# Patient Record
Sex: MALE | Race: WHITE | Employment: FULL TIME | URBAN - NONMETROPOLITAN AREA
[De-identification: names, ages, dates, MRNs, and addresses within clinical notes are randomized per-mention and may not be internally consistent; named-entity substitution may affect disease eponyms.]

---

## 2017-07-19 ENCOUNTER — HOSPITAL ENCOUNTER (OUTPATIENT)
Dept: PHYSICAL THERAPY | Age: 32
Setting detail: THERAPIES SERIES
Discharge: HOME | End: 2017-07-19
Payer: MEDICARE

## 2017-07-19 ENCOUNTER — HOSPITAL ENCOUNTER (OUTPATIENT)
Dept: OCCUPATIONAL THERAPY | Age: 32
Setting detail: THERAPIES SERIES
Discharge: HOME | End: 2017-07-19
Payer: MEDICARE

## 2017-07-19 DIAGNOSIS — M25.552 PAIN IN LEFT HIP: ICD-10-CM

## 2017-09-15 ENCOUNTER — APPOINTMENT (OUTPATIENT)
Dept: GENERAL RADIOLOGY | Age: 32
End: 2017-09-15
Payer: MEDICARE

## 2017-09-15 ENCOUNTER — HOSPITAL ENCOUNTER (EMERGENCY)
Age: 32
Discharge: HOME | End: 2017-09-15
Attending: FAMILY MEDICINE
Payer: MEDICARE

## 2017-09-15 VITALS
TEMPERATURE: 97 F | RESPIRATION RATE: 16 BRPM | HEART RATE: 65 BPM | OXYGEN SATURATION: 98 % | DIASTOLIC BLOOD PRESSURE: 88 MMHG | SYSTOLIC BLOOD PRESSURE: 131 MMHG

## 2017-09-15 DIAGNOSIS — S92.301A CLOSED DISPLACED FRACTURE OF METATARSAL BONE OF RIGHT FOOT, UNSPECIFIED METATARSAL, INITIAL ENCOUNTER: Primary | ICD-10-CM

## 2017-09-15 PROCEDURE — 73630 X-RAY EXAM OF FOOT: CPT

## 2017-09-15 PROCEDURE — 99283 EMERGENCY DEPT VISIT LOW MDM: CPT

## 2017-09-15 PROCEDURE — 29515 APPLICATION SHORT LEG SPLINT: CPT

## 2017-09-15 RX ORDER — HYDROCODONE BITARTRATE AND ACETAMINOPHEN 7.5; 325 MG/1; MG/1
1 TABLET ORAL EVERY 6 HOURS PRN
Qty: 12 TABLET | Refills: 0 | Status: SHIPPED | OUTPATIENT
Start: 2017-09-15 | End: 2017-09-22

## 2017-09-15 ASSESSMENT — ENCOUNTER SYMPTOMS
BACK PAIN: 0
VOMITING: 0
SHORTNESS OF BREATH: 0
WHEEZING: 0
NAUSEA: 0

## 2017-09-15 ASSESSMENT — PAIN DESCRIPTION - ORIENTATION: ORIENTATION: RIGHT

## 2017-09-15 ASSESSMENT — PAIN DESCRIPTION - LOCATION: LOCATION: FOOT

## 2017-09-27 ENCOUNTER — OFFICE VISIT (OUTPATIENT)
Dept: RHEUMATOLOGY | Age: 32
End: 2017-09-27
Payer: MEDICARE

## 2017-09-27 VITALS
RESPIRATION RATE: 16 BRPM | BODY MASS INDEX: 28.95 KG/M2 | SYSTOLIC BLOOD PRESSURE: 120 MMHG | DIASTOLIC BLOOD PRESSURE: 75 MMHG | HEIGHT: 68 IN | WEIGHT: 191 LBS | HEART RATE: 96 BPM

## 2017-09-27 DIAGNOSIS — M25.50 POLYARTHRALGIA: ICD-10-CM

## 2017-09-27 DIAGNOSIS — G89.29 CHRONIC LOW BACK PAIN WITHOUT SCIATICA, UNSPECIFIED BACK PAIN LATERALITY: ICD-10-CM

## 2017-09-27 DIAGNOSIS — R53.83 FATIGUE, UNSPECIFIED TYPE: Primary | ICD-10-CM

## 2017-09-27 DIAGNOSIS — M79.10 MYALGIA: ICD-10-CM

## 2017-09-27 DIAGNOSIS — M54.50 CHRONIC LOW BACK PAIN WITHOUT SCIATICA, UNSPECIFIED BACK PAIN LATERALITY: ICD-10-CM

## 2017-09-27 PROCEDURE — 99204 OFFICE O/P NEW MOD 45 MIN: CPT | Performed by: INTERNAL MEDICINE

## 2017-09-27 ASSESSMENT — ENCOUNTER SYMPTOMS
ABDOMINAL PAIN: 1
NAUSEA: 1
BLURRED VISION: 1
WHEEZING: 1
COUGH: 1
HEARTBURN: 1

## 2017-12-04 ENCOUNTER — HOSPITAL ENCOUNTER (OUTPATIENT)
Dept: GENERAL RADIOLOGY | Age: 32
Discharge: HOME | End: 2017-12-04
Payer: MEDICARE

## 2017-12-04 ENCOUNTER — HOSPITAL ENCOUNTER (OUTPATIENT)
Age: 32
Discharge: HOME | End: 2017-12-04
Payer: MEDICARE

## 2017-12-04 DIAGNOSIS — M54.50 CHRONIC LOW BACK PAIN WITHOUT SCIATICA, UNSPECIFIED BACK PAIN LATERALITY: ICD-10-CM

## 2017-12-04 DIAGNOSIS — G89.29 CHRONIC LOW BACK PAIN WITHOUT SCIATICA, UNSPECIFIED BACK PAIN LATERALITY: ICD-10-CM

## 2017-12-04 DIAGNOSIS — M79.10 MYALGIA: ICD-10-CM

## 2017-12-04 DIAGNOSIS — M25.50 POLYARTHRALGIA: ICD-10-CM

## 2017-12-04 DIAGNOSIS — R53.83 FATIGUE, UNSPECIFIED TYPE: ICD-10-CM

## 2017-12-04 LAB
ALBUMIN SERPL-MCNC: 4.2 G/DL (ref 3.5–5.1)
ALP BLD-CCNC: 76 U/L (ref 38–126)
ALT SERPL-CCNC: 17 U/L (ref 11–66)
ANION GAP SERPL CALCULATED.3IONS-SCNC: 10 MEQ/L (ref 8–16)
AST SERPL-CCNC: 16 U/L (ref 5–40)
BILIRUB SERPL-MCNC: 0.6 MG/DL (ref 0.3–1.2)
BUN BLDV-MCNC: 11 MG/DL (ref 7–22)
C-REACTIVE PROTEIN: 0.51 MG/DL (ref 0–1)
CALCIUM SERPL-MCNC: 9.1 MG/DL (ref 8.5–10.5)
CHLORIDE BLD-SCNC: 103 MEQ/L (ref 98–111)
CO2: 26 MEQ/L (ref 23–33)
CREAT SERPL-MCNC: 0.8 MG/DL (ref 0.4–1.2)
GFR SERPL CREATININE-BSD FRML MDRD: > 90 ML/MIN/1.73M2
GLUCOSE BLD-MCNC: 109 MG/DL (ref 70–108)
HCT VFR BLD CALC: 43.5 % (ref 42–52)
HEMOGLOBIN: 14.7 GM/DL (ref 14–18)
LD: 163 U/L (ref 100–190)
MCH RBC QN AUTO: 29.4 PG (ref 27–31)
MCHC RBC AUTO-ENTMCNC: 33.7 GM/DL (ref 33–37)
MCV RBC AUTO: 87.3 FL (ref 80–94)
PDW BLD-RTO: 13.6 % (ref 11.5–14.5)
PLATELET # BLD: 169 THOU/MM3 (ref 130–400)
PMV BLD AUTO: 8.6 MCM (ref 7.4–10.4)
POTASSIUM SERPL-SCNC: 4.1 MEQ/L (ref 3.5–5.2)
RBC # BLD: 4.99 MILL/MM3 (ref 4.7–6.1)
RHEUMATOID FACTOR: < 10 IU/ML (ref 0–13)
SEDIMENTATION RATE, ERYTHROCYTE: 10 MM/HR (ref 0–10)
SODIUM BLD-SCNC: 139 MEQ/L (ref 135–145)
TOTAL CK: 92 U/L (ref 55–170)
TOTAL PROTEIN: 7.2 G/DL (ref 6.1–8)
TSH SERPL DL<=0.05 MIU/L-ACNC: 0.68 UIU/ML (ref 0.4–4.2)
WBC # BLD: 9.3 THOU/MM3 (ref 4.8–10.8)

## 2017-12-04 PROCEDURE — 36415 COLL VENOUS BLD VENIPUNCTURE: CPT

## 2017-12-04 PROCEDURE — 83615 LACTATE (LD) (LDH) ENZYME: CPT

## 2017-12-04 PROCEDURE — 85027 COMPLETE CBC AUTOMATED: CPT

## 2017-12-04 PROCEDURE — 82550 ASSAY OF CK (CPK): CPT

## 2017-12-04 PROCEDURE — 72202 X-RAY EXAM SI JOINTS 3/> VWS: CPT

## 2017-12-04 PROCEDURE — 86038 ANTINUCLEAR ANTIBODIES: CPT

## 2017-12-04 PROCEDURE — 85651 RBC SED RATE NONAUTOMATED: CPT

## 2017-12-04 PROCEDURE — 72100 X-RAY EXAM L-S SPINE 2/3 VWS: CPT

## 2017-12-04 PROCEDURE — 86430 RHEUMATOID FACTOR TEST QUAL: CPT

## 2017-12-04 PROCEDURE — 80053 COMPREHEN METABOLIC PANEL: CPT

## 2017-12-04 PROCEDURE — 82085 ASSAY OF ALDOLASE: CPT

## 2017-12-04 PROCEDURE — 84443 ASSAY THYROID STIM HORMONE: CPT

## 2017-12-04 PROCEDURE — 86140 C-REACTIVE PROTEIN: CPT

## 2017-12-04 PROCEDURE — 86200 CCP ANTIBODY: CPT

## 2017-12-05 ENCOUNTER — TELEPHONE (OUTPATIENT)
Dept: RHEUMATOLOGY | Age: 32
End: 2017-12-05

## 2017-12-06 ENCOUNTER — TELEPHONE (OUTPATIENT)
Dept: RHEUMATOLOGY | Age: 32
End: 2017-12-06

## 2017-12-06 LAB
ALDOLASE: 3.9 U/L (ref 1.5–8.1)
CYCLIC CITRULLIN PEPTIDE AB: 3 UNITS (ref 0–19)

## 2017-12-07 ENCOUNTER — TELEPHONE (OUTPATIENT)
Dept: RHEUMATOLOGY | Age: 32
End: 2017-12-07

## 2017-12-07 LAB — ANA SCREEN: NORMAL

## 2017-12-11 ENCOUNTER — HOSPITAL ENCOUNTER (EMERGENCY)
Age: 32
Discharge: HOME | End: 2017-12-11
Payer: MEDICARE

## 2018-01-24 ENCOUNTER — HOSPITAL ENCOUNTER (OUTPATIENT)
Dept: OCCUPATIONAL THERAPY | Age: 33
Setting detail: THERAPIES SERIES
Discharge: HOME | End: 2018-01-24
Payer: MEDICARE

## 2018-01-24 PROCEDURE — 97165 OT EVAL LOW COMPLEX 30 MIN: CPT

## 2018-01-24 PROCEDURE — 97110 THERAPEUTIC EXERCISES: CPT

## 2018-01-24 ASSESSMENT — PAIN DESCRIPTION - LOCATION: LOCATION: SHOULDER

## 2018-01-24 ASSESSMENT — PAIN SCALES - GENERAL: PAINLEVEL_OUTOF10: 4

## 2018-01-24 ASSESSMENT — PAIN DESCRIPTION - ORIENTATION: ORIENTATION: RIGHT;OTHER (COMMENT)

## 2018-01-24 NOTE — PROGRESS NOTES
I certify that I have examined the patient below and determined that Physical Medicine and Rehabilitation service is necessary; that the secondary diagnosis for the provision of rehabilitation services is consistent with identified needs; that service will be furnished on an outpatient basis while the patient is in my care; that I approve the above plan of care for up to 90 days or as specifically noted above and will review it within that time frame or more often if the patients condition requires. Attestation, signature or co-signature of physician indicates approval of certification requirements.    ________________________ ____________ __________  Physician Signature   Date   Time    [address]    Time In: 1800  Time Out: 1850  Minutes: 50  Timed Code Treatment Minutes: 25 Minutes     Date: 2018  Patient Name: Timothy Benítez        CSN: [id number]     : 1985  (34 y.o.)  Gender: male   Referring Practitioner: Nadira Covarrubias PA-C  Diagnosis: S49.90XA shoulder injury, right slap tear, biceps tendonitis  Treatment Diagnosis: right shoulder pain  Additional Pertinent Hx: Patient relates that he started having problems last spring with right shoulder. Patient fell while in a diOmnigy week whacking. Had OT last summer and then had MRI which showed a \"slightly torn RC and torn bicep tendon\". Patient had surgery on 17. Patient relates that he was told that they \"put in 2-3 stitiches in [address] [place], [place] [address], relocated the bicep tendon,removed a spur, and cleaned it up. \" Script does not indicate this procedure. Reviewed medications and allergies with patient - correct on medical history form. Comorobidites include anxiety, brain injury, and arthritis. See Medical History Questionnaire for information related to allergies and medications.     General:  OT Visit Information  Onset Date: 18  OT Insurance Information: [place] [employer]age - 30 visits; no cold/hot packs, no ionto  Total # of Visits Approved: 30  Total # of Visits to Date: 1  Certification Period Expiration Date: 03/21/18  Progress Note Counter: 1/10 for PN  Comments: returns to referring provider on 2/28  Chart Reviewed: Yes    Restrictions/Precautions:       Position Activity Restriction  Other position/activity restrictions: no lifting with right arm         Subjective:  Subjective: Cooperative with evaluation. Reports that he was told that PA was pleased with his motion at his last appointment. Pain:  Pain Assessment  Patient Currently in Pain: Yes  Pain Assessment: 0-10  Pain Level: 4 (relates that pain increases to 10/10 for short time if he \"moves my arm wrong\". 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 Relates that has abnormal sensations between fingers on right hand - most noticeable during the shower.           Hand Dominance: Right                    RUE PROM (degrees)  RUE PROM: Exceptions  R Shoulder Flex  0-180: 140  R Shoulder ABduction 0-180: 110  R Shoulder Int Rotation  0-70: 35  R Shoulder Ext Rotation  0-90: 65  RUE AROM (degrees)  RUE AROM : Exceptions  R Shoulder Flexion 0-180: 90  R Shoulder ABduction 0-180: 80  R Shoulder Int Rotation  0-70: can get right thumb to the top of right back pocket  R Shoulder Ext Rotation 0-90: 32 at side                                                                                                      ADL  Additional Comments: Reports having difficulty sleeping - continuing to sleep in recliner or couch and relates that he wakes 1-2 x per night due to right arm pain. Patient relates having quite a bit of difficulty with dressing, hair care, bathing, and household activities. Patient is not allowed to do any lifting at current time. Activity Tolerance: Additional Comments: Tolerated evaluation and treatment well     Assessment:  Performance deficits / Impairments: Decreased ADL status, Decreased ROM, Decreased strength, Decreased high-level IADLs  Assessment: Patient meets criteria for low complexity evaluation based on documented evaluation findings. Prognosis: Good  Clinical Decision Making: Clinical Decision making was of Low Complexity as the result of analysis of data from a problem focused assessment, a consideration of a limited number of treatment options, no significant comorbidities affecting the plan of care and no modification or assistance required to complete the evaluation.     Patient Education:  Patient Education: OT POC, HEP - scapular retraction, backward shoulder circles, bilateral table slides for shoulder flexion, supine dowel for shoulder flexion and chest press  Barriers to Learning: none          Treatment Initiated : supine PROM to right shoulder in all planes to patient tolerance - slight hard end feel with IR; completed 10 reps of following exericses: bilateral table slides for shoulder flexion, supine dowel for shoulder flexion and chest press, scapular retraction, backward shoulder circles    Plan:  Times per week: 2-3 x week  Plan weeks: 8 weeks  Current Treatment Recommendations: Strengthening, ROM, Pain Management, Patient/Caregiver Education & Training, Other (comment) (taping)  Plan Comment: POC established and discussed with patient  Specific instructions for Next Treatment: PROM, stretching    Goals:  Patient goals : Be able to do my job. Be able to throw football with kids. Short term goals  Time Frame for Short term goals: 4 weeks  Short term goal 1: Be independent with HEP as instructed to increase his ability to care for children. Short term goal 2: Increase active right shoulder flexion to 120, abduction to 110, get right thumb to waistband behind back, and ER at side to 45 to increase ability to complete dressing tasks. Short term goal 3: Increase passive right shoulder flexion to 160, abduciton to 140, IR to 50, and ER to 80 to increase his flexibility to eventually allow patient to complete work takss. Short term goal 4: Report pain going no higher than 4/10 at any time in right shoulder to allow improved sleeping routine. Long term goals  Time Frame for Long term goals : 8 weeks  Long term goal 1: Be able to complete all dressing and bathing tasks without difficulty. Long term goal 2: Be able to retrieve 3# item from upper cupboard using right UE without pain or compensation. Long term goal 3: Be able to drive without right shoulder pain or any difficulty. Long term goal 4: Be able to use right arm to retrieve groceries from car and carry into house without difficulty. OT G-codes  Functional Assessment Tool Used: Upper Extremity Functional Scale  Score: 20       Evaluation Complexity: Based on the findings of patient history, examination, clinical presentation, and decision making during this evaluation, this patient is of low complexity.     Johnita Kussmaul, OTR/L #[id number]

## 2018-01-26 ENCOUNTER — HOSPITAL ENCOUNTER (OUTPATIENT)
Dept: OCCUPATIONAL THERAPY | Age: 33
Setting detail: THERAPIES SERIES
Discharge: HOME | End: 2018-01-26
Payer: MEDICARE

## 2018-01-26 PROCEDURE — 97110 THERAPEUTIC EXERCISES: CPT

## 2018-01-26 ASSESSMENT — PAIN SCALES - GENERAL: PAINLEVEL_OUTOF10: 3

## 2018-01-26 ASSESSMENT — PAIN DESCRIPTION - ORIENTATION: ORIENTATION: RIGHT

## 2018-01-26 ASSESSMENT — PAIN DESCRIPTION - LOCATION: LOCATION: SHOULDER

## 2018-01-26 NOTE — PROGRESS NOTES
[address] [address]  OUTPATIENT OCCUPATIONAL THERAPY  Daily Note  [facility]    Time In: 5742  Time Out: [address]  Minutes: 30  Timed Code Treatment Minutes: 30 Minutes     Date: 2018  Patient Name: Dago Frederick        CSN: [id number]   : 1985  (34 y.o.)  Gender: male   Referring Practitioner: Johny Freedman PA-C  Diagnosis: S49.90XA shoulder injury, right slap tear, biceps tendonitis          General:  OT Visit Information  Onset Date: 18  OT Insurance Information: [place] [employer] - 30 visits; no cold/hot packs, no ionto  Total # of Visits Approved: 30  Total # of Visits to Date: 2  Certification Period Expiration Date: 18  Progress Note Counter: 2/10 for PN  Comments: returns to referring provider on        Restrictions/Precautions:       Position Activity Restriction  Other position/activity restrictions: no lifting with right arm         Subjective:  Subjective: States that he was sore for a couple hours after evaluation. Relates that he didn't use any ice for pain control. Pain:  Patient Currently in Pain: Yes  Pain Assessment: 0-10  Pain Level: 3  Pain Location: Shoulder  Pain Orientation: Right       Objective:     Upper Extremity Function  UE AROM: scapular retraction and backward shoulder circles x 10 reps  UE PROM: bilateral table slides for shoulder flexion x 10 reps, table slides for right shoulder abduction x 10 reps; supine PROM to right shoulder in all planes to patient tolerance  UE AAROM: supine dowel for chest press and shoulder flexion x 15 reps each                                                Activity Tolerance: Additional Comments:  Tolerated session well    Assessment:  Assessment: Progressing toward goals    Patient Education:  Patient Education: importance of using ice for pain relief; table slides for abduction            Plan:  Plan Comment: Continue per established POC  Specific instructions for Next Treatment: PROM, stretching                      Shalom Counter, OTR/L #[id number]

## 2018-01-29 ENCOUNTER — HOSPITAL ENCOUNTER (OUTPATIENT)
Dept: OCCUPATIONAL THERAPY | Age: 33
Setting detail: THERAPIES SERIES
Discharge: HOME | End: 2018-01-29
Payer: MEDICARE

## 2018-01-29 PROCEDURE — 97110 THERAPEUTIC EXERCISES: CPT

## 2018-01-29 ASSESSMENT — PAIN SCALES - GENERAL: PAINLEVEL_OUTOF10: 3

## 2018-01-29 ASSESSMENT — PAIN DESCRIPTION - LOCATION: LOCATION: SHOULDER

## 2018-01-29 ASSESSMENT — PAIN DESCRIPTION - ORIENTATION: ORIENTATION: RIGHT

## 2018-01-29 NOTE — PROGRESS NOTES
goals    Patient Education:  Patient Education: wall slides for shoulder flexion, standing dowel for shoulder extension and IR up back            Plan:  Plan Comment: Continue per established POC  Specific instructions for Next Treatment: PROM, stretching                      Amanda Lopes OTR/L #[id number]

## 2018-01-31 ENCOUNTER — HOSPITAL ENCOUNTER (OUTPATIENT)
Dept: OCCUPATIONAL THERAPY | Age: 33
Setting detail: THERAPIES SERIES
Discharge: HOME | End: 2018-01-31
Payer: MEDICARE

## 2018-01-31 PROCEDURE — 97110 THERAPEUTIC EXERCISES: CPT

## 2018-01-31 ASSESSMENT — PAIN SCALES - GENERAL: PAINLEVEL_OUTOF10: 3

## 2018-01-31 ASSESSMENT — PAIN DESCRIPTION - LOCATION: LOCATION: SHOULDER

## 2018-01-31 ASSESSMENT — PAIN DESCRIPTION - ORIENTATION: ORIENTATION: RIGHT

## 2018-02-02 ENCOUNTER — HOSPITAL ENCOUNTER (OUTPATIENT)
Dept: OCCUPATIONAL THERAPY | Age: 33
Setting detail: THERAPIES SERIES
Discharge: HOME | End: 2018-02-02
Payer: MEDICARE

## 2018-02-02 PROCEDURE — 97110 THERAPEUTIC EXERCISES: CPT

## 2018-02-02 ASSESSMENT — PAIN DESCRIPTION - LOCATION: LOCATION: SHOULDER

## 2018-02-02 ASSESSMENT — PAIN SCALES - GENERAL: PAINLEVEL_OUTOF10: 3

## 2018-02-02 ASSESSMENT — PAIN DESCRIPTION - ORIENTATION: ORIENTATION: RIGHT

## 2018-02-02 NOTE — PROGRESS NOTES
[address] [place] [place] [address]  OUTPATIENT OCCUPATIONAL THERAPY  Daily Note  [facility]    Time In: 0830  Time Out: 0900  Minutes: 30  Timed Code Treatment Minutes: 30 Minutes     Date: 2018  Patient Name: Moses Villatoro        CSN: [id number]   : 1985  (34 y.o.)  Gender: male   Referring Practitioner: Lynn Zamora PA-C  Diagnosis: S49.90XA shoulder injury, right slap tear, biceps tendonitis          General:  OT Visit Information  Onset Date: 18  OT Insurance Information: [place] [employer]age - 30 visits; no cold/hot packs, no ionto  Total # of Visits Approved: 30  Total # of Visits to Date: 5  Certification Period Expiration Date: 18  Progress Note Counter: 5/10 for PN  Comments: returns to referring provider on        Restrictions/Precautions:       Position Activity Restriction  Other position/activity restrictions: no lifting with right arm         Subjective:  Subjective: Patient reports that he has a base pain of about 3/10. Pain:  Patient Currently in Pain: Yes  Pain Assessment: 0-10  Pain Level: 3  Pain Location: Shoulder  Pain Orientation: Right       Objective:     Upper Extremity Function  UE AROM: scapular retraction and backward shoulder circles x 10 reps; biodex at 120 speed x 3 minutes forward and 3 minutes backward - completed for ROM purposes only  UE PROM: bilateral table slides for shoulder flexion x 10 reps; supine PROM to right shoulder in all planes to patient tolerance except ER to protocol. UE AAROM: supine dowel for shoulder flexion and chest press x 15 reps each; standing dowel for shoulder extension and IR up back x 15 reps each  UE Stretching: bilateral wall slides for shoulder flexion x 15 reps; gentle GH joint mobs              Activity Tolerance: Additional Comments: Tolerated session well    Assessment:  Assessment: Progressing toward goals.       Patient Education:  Patient Education: no changes to HEP            Plan:  Current Treatment Recommendations: Strengthening, ROM, Pain Management, Patient/Caregiver Education & Training, Other (comment)  Plan Comment: Continue per established POC  Specific instructions for Next Treatment: PROM, stretching                      lAesha Martinez, MOT, OTR/L [id number]

## 2018-02-05 ENCOUNTER — HOSPITAL ENCOUNTER (OUTPATIENT)
Dept: OCCUPATIONAL THERAPY | Age: 33
Setting detail: THERAPIES SERIES
Discharge: HOME | End: 2018-02-05
Payer: MEDICARE

## 2018-02-05 PROCEDURE — 97110 THERAPEUTIC EXERCISES: CPT

## 2018-02-05 ASSESSMENT — PAIN SCALES - GENERAL: PAINLEVEL_OUTOF10: 5

## 2018-02-05 ASSESSMENT — PAIN DESCRIPTION - LOCATION: LOCATION: SHOULDER

## 2018-02-07 ENCOUNTER — HOSPITAL ENCOUNTER (OUTPATIENT)
Dept: OCCUPATIONAL THERAPY | Age: 33
Setting detail: THERAPIES SERIES
Discharge: HOME | End: 2018-02-07
Payer: MEDICARE

## 2018-02-07 PROCEDURE — 97110 THERAPEUTIC EXERCISES: CPT

## 2018-02-07 ASSESSMENT — PAIN SCALES - GENERAL: PAINLEVEL_OUTOF10: 4

## 2018-02-07 ASSESSMENT — PAIN DESCRIPTION - ORIENTATION: ORIENTATION: RIGHT

## 2018-02-07 ASSESSMENT — PAIN DESCRIPTION - LOCATION: LOCATION: SHOULDER

## 2018-02-09 ENCOUNTER — HOSPITAL ENCOUNTER (OUTPATIENT)
Dept: OCCUPATIONAL THERAPY | Age: 33
Setting detail: THERAPIES SERIES
Discharge: HOME | End: 2018-02-09
Payer: MEDICARE

## 2018-02-09 PROCEDURE — 97110 THERAPEUTIC EXERCISES: CPT

## 2018-02-09 ASSESSMENT — PAIN DESCRIPTION - ORIENTATION: ORIENTATION: RIGHT

## 2018-02-09 ASSESSMENT — PAIN SCALES - GENERAL: PAINLEVEL_OUTOF10: 3

## 2018-02-09 ASSESSMENT — PAIN DESCRIPTION - LOCATION: LOCATION: SHOULDER

## 2018-02-09 NOTE — PROGRESS NOTES
[address] [address]  OUTPATIENT OCCUPATIONAL THERAPY  Daily Note  [facility]    Time In: 8517  Time Out: 1055  Minutes: 25  Timed Code Treatment Minutes: 25 Minutes     Date: 2018  Patient Name: Heath Clark        CSN: [id number]   : 1985  (34 y.o.)  Gender: male   Referring Practitioner: Muna Mccoy PA-C  Diagnosis: S49.90XA shoulder injury, right slap tear, biceps tendonitis          General:  OT Visit Information  Onset Date: 18  OT Insurance Information: [place] [employer] - 30 visits; no cold/hot packs, no ionto  Total # of Visits Approved: 30  Total # of Visits to Date: 8  Certification Period Expiration Date: 18  Progress Note Counter: 8/10 for PN  Comments: returns to referring provider on        Restrictions/Precautions:       Position Activity Restriction  Other position/activity restrictions: no lifting with right arm         Subjective:  Subjective: Patient states that he was given script for flexeril and tramadol. States that the pain is helping with the pain. Pain:  Patient Currently in Pain: Yes  Pain Assessment: 0-10  Pain Level: 3  Pain Location: Shoulder  Pain Orientation: Right       Objective:     Upper Extremity Function  UE AROM: scapular retraction and backward shoulder circles x 10 reps each; biodex at 120 speed x 3 minutes forward and 3 minutes backward - compelted for ROM purposes only; rainbow circles on wall x 5 reps in each direction with left UE  UE PROM: bilateral table slides for shoulder flexion x 10 reps; table sldies for shoulder abduction x 10 reps; pulleys for shoulder flexion; supine PROM to right shoulder in all planes to patient tolerance  UE AAROM: bilateral wall slides for shoulder flexion x 10 reps  UE Stretching: standing sleeper stretch x 5 reps for right shoulder IR                                                Activity Tolerance: Additional Comments:  Tolerated session well    Assessment:  Assessment: Progressing toward goals.       Patient Education:  Patient Education: standing sleeper stretch, rainbow circles            Plan:  Plan Comment: Continue per established POC  Specific instructions for Next Treatment: PROM, stretching                      Avery Alexsandra, OTR/L #[id number]

## 2018-02-12 ENCOUNTER — HOSPITAL ENCOUNTER (OUTPATIENT)
Dept: OCCUPATIONAL THERAPY | Age: 33
Setting detail: THERAPIES SERIES
Discharge: HOME | End: 2018-02-12
Payer: MEDICARE

## 2018-02-12 PROCEDURE — 97110 THERAPEUTIC EXERCISES: CPT

## 2018-02-12 ASSESSMENT — PAIN DESCRIPTION - LOCATION: LOCATION: SHOULDER

## 2018-02-12 ASSESSMENT — PAIN SCALES - GENERAL: PAINLEVEL_OUTOF10: 3

## 2018-02-12 ASSESSMENT — PAIN DESCRIPTION - ORIENTATION: ORIENTATION: RIGHT

## 2018-02-14 ENCOUNTER — HOSPITAL ENCOUNTER (OUTPATIENT)
Dept: OCCUPATIONAL THERAPY | Age: 33
Setting detail: THERAPIES SERIES
End: 2018-02-14
Payer: MEDICARE

## 2018-02-16 ENCOUNTER — HOSPITAL ENCOUNTER (OUTPATIENT)
Dept: OCCUPATIONAL THERAPY | Age: 33
Setting detail: THERAPIES SERIES
Discharge: HOME | End: 2018-02-16
Payer: MEDICARE

## 2018-02-16 PROCEDURE — 97110 THERAPEUTIC EXERCISES: CPT

## 2018-02-16 ASSESSMENT — PAIN SCALES - GENERAL: PAINLEVEL_OUTOF10: 3

## 2018-02-16 ASSESSMENT — PAIN DESCRIPTION - LOCATION: LOCATION: SHOULDER

## 2018-02-16 ASSESSMENT — PAIN DESCRIPTION - ORIENTATION: ORIENTATION: RIGHT

## 2018-02-16 NOTE — PROGRESS NOTES
Activity Tolerance: Additional Comments: Tolerated session well    Assessment:  Assessment: Patient is making very nice progress toward goals. AROM and strength are progressing nicely. Patient does have limitations with IR of right shoulder. Further therapy is required to address AROM and strengthening of right shoulder to assist in patient returning to work duties. Patient Education:  Patient Education: goal status; [place] protocol with right UE, IR stretch behind back with towel            Plan:  Times per week: 2-3 x week  Plan weeks: 5 weeks  Plan Comment: POC updated and discussed with patient  Specific instructions for Next Treatment: AROM, strengthening    Patient goals : Be able to do my job. Be able to throw football with kids. Short term goals  Time Frame for Short term goals: 4 weeks  Short term goal 1: Be independent with HEP as instructed to increase his ability to care for children. GOAL MET - CONTINUE WITH HEP UPDATES  Short term goal 2: Increase active right shoulder flexion to 120, abduction to 110, get right thumb to waistband behind back, and ER at side to 45 to increase ability to complete dressing tasks. GOAL MET - SEE OBJECTIVE SECTION OF NOTE - REVISED GOAL; Increase active right shoulder IR to allow patient to get right thumb 5\" above waistband behind back and ER to 80 degrees to increase his ability to eventually do work tasks. Short term goal 3: Increase passive right shoulder flexion to 160, abduciton to 140, IR to 50, and ER to 80 to increase his flexibility to eventually allow patient to complete work tasks. GOAL MET - SEE OBJECTIVE SECTION OF NOTE FOR DETAILS - REVISED GOAL: Increase passive right shoulder IR to at least 65 to increase flexibility to allow patient to tuck in shirt with less difficulty. Short term goal 4: Report pain going no higher than 4/10 at any time in right shoulder to allow improved sleeping routine.  GOAL MET - REVISED GOAL; Report pain going no higher than 3/10 in right shoulder with work tasks. Long term goals  Time Frame for Long term goals : 5 weeks  Long term goal 1: Be able to complete all dressing and bathing tasks without difficulty. GOAL MET - REVISED GOAL:  Be able to use right arm to start leaf blower without difficulty. Long term goal 2: Be able to retrieve 3# item from upper cupboard using right UE without pain or compensation. GOAL NOT MET - CONTINUE GOAL  Long term goal 3: Be able to drive without right shoulder pain or any difficulty. GOAL NOT MET - RELATES VERY SLIGHT PAIN WITH TURNING STEERING WHEEL - [place] term goal 4: Be able to use right arm to retrieve groceries from car and carry into house without difficulty. 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              Harry Baeza, OTR/L #[id number]

## 2018-02-19 ENCOUNTER — HOSPITAL ENCOUNTER (OUTPATIENT)
Dept: OCCUPATIONAL THERAPY | Age: 33
Setting detail: THERAPIES SERIES
Discharge: HOME | End: 2018-02-19
Payer: MEDICARE

## 2018-02-19 PROCEDURE — 97110 THERAPEUTIC EXERCISES: CPT

## 2018-02-19 ASSESSMENT — PAIN SCALES - GENERAL: PAINLEVEL_OUTOF10: 2

## 2018-02-19 ASSESSMENT — PAIN DESCRIPTION - LOCATION: LOCATION: SHOULDER

## 2018-02-19 ASSESSMENT — PAIN DESCRIPTION - ORIENTATION: ORIENTATION: RIGHT

## 2018-02-21 ENCOUNTER — HOSPITAL ENCOUNTER (OUTPATIENT)
Dept: OCCUPATIONAL THERAPY | Age: 33
Setting detail: THERAPIES SERIES
End: 2018-02-21
Payer: MEDICARE

## 2018-02-23 ENCOUNTER — HOSPITAL ENCOUNTER (OUTPATIENT)
Dept: OCCUPATIONAL THERAPY | Age: 33
Setting detail: THERAPIES SERIES
End: 2018-02-23
Payer: MEDICARE

## 2018-02-26 ENCOUNTER — HOSPITAL ENCOUNTER (OUTPATIENT)
Dept: OCCUPATIONAL THERAPY | Age: 33
Setting detail: THERAPIES SERIES
Discharge: HOME | End: 2018-02-26
Payer: MEDICARE

## 2018-02-26 PROCEDURE — 97110 THERAPEUTIC EXERCISES: CPT

## 2018-02-26 ASSESSMENT — PAIN DESCRIPTION - ORIENTATION: ORIENTATION: RIGHT

## 2018-02-26 ASSESSMENT — PAIN SCALES - GENERAL: PAINLEVEL_OUTOF10: 2

## 2018-02-26 ASSESSMENT — PAIN DESCRIPTION - LOCATION: LOCATION: SHOULDER

## 2018-02-28 ENCOUNTER — HOSPITAL ENCOUNTER (OUTPATIENT)
Dept: OCCUPATIONAL THERAPY | Age: 33
Setting detail: THERAPIES SERIES
Discharge: HOME | End: 2018-02-28
Payer: MEDICARE

## 2018-02-28 PROCEDURE — 97110 THERAPEUTIC EXERCISES: CPT

## 2018-02-28 ASSESSMENT — PAIN DESCRIPTION - LOCATION: LOCATION: SHOULDER

## 2018-02-28 ASSESSMENT — PAIN DESCRIPTION - ORIENTATION: ORIENTATION: RIGHT

## 2018-02-28 ASSESSMENT — PAIN SCALES - GENERAL: PAINLEVEL_OUTOF10: 3

## 2018-03-02 ENCOUNTER — HOSPITAL ENCOUNTER (OUTPATIENT)
Dept: OCCUPATIONAL THERAPY | Age: 33
Setting detail: THERAPIES SERIES
Discharge: HOME | End: 2018-03-02
Payer: MEDICARE

## 2018-03-02 PROCEDURE — 97110 THERAPEUTIC EXERCISES: CPT

## 2018-03-02 ASSESSMENT — PAIN DESCRIPTION - ORIENTATION: ORIENTATION: RIGHT

## 2018-03-02 ASSESSMENT — PAIN SCALES - GENERAL: PAINLEVEL_OUTOF10: 2

## 2018-03-02 ASSESSMENT — PAIN DESCRIPTION - LOCATION: LOCATION: SHOULDER

## 2018-03-02 NOTE — PROGRESS NOTES
[address] [address]  OUTPATIENT OCCUPATIONAL THERAPY  Daily Note  [facility]    Time In: 1400  Time Out: 1440  Minutes: 40  Timed Code Treatment Minutes: 40 Minutes     Date: 3/2/2018  Patient Name: Julienne Vale        CSN: [id number]   : 1985  (34 y.o.)  Gender: male   Referring Practitioner: Kaitlin Pena PA-C  Diagnosis: S49.90XA shoulder injury, right slap tear, biceps tendonitis          General:  OT Visit Information  Onset Date: 18  OT Insurance Information: [place] [employer]age - 30 visits; no cold/hot packs, no ionto  Total # of Visits to Date: 14  Progress Note Counter: PN completed on ; 4/10 for PN  Comments: returns to referring provider on        Restrictions/Precautions:  Restrictions/Precautions: Weight Bearing    Position Activity Restriction  Other position/activity restrictions: no lifting with right arm         Subjective:  Subjective: Patient reports his shoulder still popps and has crunching with motion . Pain:  Patient Currently in Pain: Yes  Pain Assessment: 0-10  Pain Level: 2  Pain Location: Shoulder  Pain Orientation: Right       Objective:     Upper Extremity Function  UE AROM: rainbow circles on wall x 15 reps in each direction with right UE Supine shoulder  ceiling circles CW stopped at 5 anterior pain CCW 10 reps  UE PROM: supine PROM to right shoulder in all planes   UE Stretching: Sidelying sleeper stretch 5 reps with prolong hold, self sleeper stretc 5 reps.  Sleeper stretch added to HEP IR stretch behind back with towel x 5 reps with 5 second hold  UE Strengthing: bidoex at 60 speed x 3 minutes forward and 3 minutes backward; reaching endurance activity with right UE -    red theraband - standing riivalid x 15 reps, michael x 15 reps with right UE and cues for flexion not to go above shoulder level; supine PREs with 1# in right UE - shoulder flexion, horizontal abduction/adduction, ceiling punches, and ceiling circles x 15 reps

## 2018-03-05 ENCOUNTER — HOSPITAL ENCOUNTER (OUTPATIENT)
Dept: OCCUPATIONAL THERAPY | Age: 33
Setting detail: THERAPIES SERIES
Discharge: HOME | End: 2018-03-05
Payer: MEDICARE

## 2018-03-05 PROCEDURE — 97110 THERAPEUTIC EXERCISES: CPT

## 2018-03-05 ASSESSMENT — PAIN SCALES - GENERAL: PAINLEVEL_OUTOF10: 3

## 2018-03-05 ASSESSMENT — PAIN DESCRIPTION - LOCATION: LOCATION: SHOULDER

## 2018-03-05 ASSESSMENT — PAIN DESCRIPTION - ORIENTATION: ORIENTATION: RIGHT

## 2018-03-05 NOTE — PROGRESS NOTES
well    Assessment:  Assessment: Progressing toward goals    Patient Education:  Patient Education: standing sleeper stretch technique            Plan:  Plan Comment: Continue per established POC  Specific instructions for Next Treatment: AROM, strengthening                      Hermila Sky, OTR/L #[id number]

## 2018-03-07 ENCOUNTER — HOSPITAL ENCOUNTER (OUTPATIENT)
Dept: OCCUPATIONAL THERAPY | Age: 33
Setting detail: THERAPIES SERIES
Discharge: HOME | End: 2018-03-07
Payer: MEDICARE

## 2018-03-07 PROCEDURE — 97110 THERAPEUTIC EXERCISES: CPT

## 2018-03-07 ASSESSMENT — PAIN DESCRIPTION - ORIENTATION: ORIENTATION: RIGHT

## 2018-03-07 ASSESSMENT — PAIN SCALES - GENERAL: PAINLEVEL_OUTOF10: 1

## 2018-03-07 ASSESSMENT — PAIN DESCRIPTION - LOCATION: LOCATION: SHOULDER

## 2018-03-07 NOTE — PROGRESS NOTES
Education:  Patient Education: to use green theraband when arm is not sore after day's work; to gradually increase to 25 reps of therband exercises            Plan:  Plan Comment: Continue per established POC  Specific instructions for Next Treatment: AROM, strengthening                      Hermila Sky, OTR/L #[id number]

## 2018-03-09 ENCOUNTER — HOSPITAL ENCOUNTER (OUTPATIENT)
Dept: OCCUPATIONAL THERAPY | Age: 33
Setting detail: THERAPIES SERIES
Discharge: HOME | End: 2018-03-09
Payer: MEDICARE

## 2018-03-09 PROCEDURE — 97110 THERAPEUTIC EXERCISES: CPT

## 2018-03-09 ASSESSMENT — PAIN DESCRIPTION - ORIENTATION: ORIENTATION: RIGHT

## 2018-03-09 ASSESSMENT — PAIN DESCRIPTION - LOCATION: LOCATION: SHOULDER

## 2018-03-09 ASSESSMENT — PAIN SCALES - GENERAL: PAINLEVEL_OUTOF10: 1

## 2018-03-12 ENCOUNTER — HOSPITAL ENCOUNTER (OUTPATIENT)
Dept: OCCUPATIONAL THERAPY | Age: 33
Setting detail: THERAPIES SERIES
End: 2018-03-12
Payer: MEDICARE

## 2018-03-14 ENCOUNTER — HOSPITAL ENCOUNTER (OUTPATIENT)
Dept: OCCUPATIONAL THERAPY | Age: 33
Setting detail: THERAPIES SERIES
End: 2018-03-14
Payer: MEDICARE

## 2018-03-16 ENCOUNTER — HOSPITAL ENCOUNTER (OUTPATIENT)
Dept: OCCUPATIONAL THERAPY | Age: 33
Setting detail: THERAPIES SERIES
Discharge: HOME | End: 2018-03-16
Payer: MEDICARE

## 2018-03-16 PROCEDURE — 97110 THERAPEUTIC EXERCISES: CPT

## 2018-03-16 ASSESSMENT — PAIN DESCRIPTION - LOCATION: LOCATION: SHOULDER

## 2018-03-16 ASSESSMENT — PAIN DESCRIPTION - ORIENTATION: ORIENTATION: RIGHT

## 2018-03-16 ASSESSMENT — PAIN SCALES - GENERAL: PAINLEVEL_OUTOF10: 1

## 2018-03-16 NOTE — PROGRESS NOTES
established POC  Specific instructions for Next Treatment: AROM, strengthening, fitness center                      George Tyson, OTR/L #[id number]

## 2018-03-19 ENCOUNTER — HOSPITAL ENCOUNTER (OUTPATIENT)
Dept: OCCUPATIONAL THERAPY | Age: 33
Setting detail: THERAPIES SERIES
Discharge: HOME | End: 2018-03-19
Payer: MEDICARE

## 2018-03-19 PROCEDURE — 97110 THERAPEUTIC EXERCISES: CPT

## 2018-03-19 ASSESSMENT — PAIN DESCRIPTION - LOCATION: LOCATION: SHOULDER

## 2018-03-19 ASSESSMENT — PAIN DESCRIPTION - ORIENTATION: ORIENTATION: RIGHT

## 2018-03-19 ASSESSMENT — PAIN SCALES - GENERAL: PAINLEVEL_OUTOF10: 2

## 2018-03-19 NOTE — PROGRESS NOTES
[facility]  OUTPATIENT OCCUPATIONAL THERAPY  Daily Note  [facility]    Time In: 3049  Time Out: 1700  Minutes: 30  Timed Code Treatment Minutes: 30 Minutes     Date: 3/19/2018  Patient Name: Modesto Richey        CSN: [id number]   : 1985  (34 y.o.)  Gender: male   Referring Practitioner: Ramon Seay PA-C  Diagnosis: S49.90XA shoulder injury, right slap tear, biceps tendonitis          General:  OT Visit Information  Onset Date: 18  OT Insurance Information: [place] [employer]age - 30 visits; no cold/hot packs, no ionto  Total # of Visits Approved: 30  Total # of Visits to Date: 23  Certification Period Expiration Date: 18  Progress Note Counter: PN completed on ; 9/10 for PN  Comments: returns to referring provider on        Restrictions/Precautions:       Position Activity Restriction  Other position/activity restrictions: no lifting with right arm         Subjective:  Subjective: States that he is worn out today. States that he is not any more sore today than he typically is at the end of the work day since returning to work. Pain:  Patient Currently in Pain: Yes  Pain Assessment: 0-10  Pain Level: 2  Pain Location: Shoulder  Pain Orientation: Right       Objective:     Upper Extremity Function  UE Strengthing: biodex at 50 speed x 3 minutes forward and 3 minutes backward ; fitness center workout: bicep curl at 25# x 15 reps, chest press at 40# x 15 rpes, seated row at 40# x 15 reps, shoulder press at 25# x 15 reps, triceps at 40# reps; body blade with right UE - 1 minute in following positions: overhead, 90 shoulder flexion, 90 shoulder abduction, down at side; green theraband - 20 reps of michael, horizontal abduction/adduction, overhead, resisted diagonals with right UE                                                Activity Tolerance: Additional Comments:  Tolerated session well    Assessment:  Assessment: Progressing toward goals    Patient

## 2018-03-21 ENCOUNTER — HOSPITAL ENCOUNTER (OUTPATIENT)
Dept: OCCUPATIONAL THERAPY | Age: 33
Setting detail: THERAPIES SERIES
End: 2018-03-21
Payer: MEDICARE

## 2018-03-23 ENCOUNTER — HOSPITAL ENCOUNTER (OUTPATIENT)
Dept: OCCUPATIONAL THERAPY | Age: 33
Setting detail: THERAPIES SERIES
Discharge: HOME | End: 2018-03-23
Payer: MEDICARE

## 2018-03-23 PROCEDURE — 97110 THERAPEUTIC EXERCISES: CPT

## 2018-03-23 NOTE — PROGRESS NOTES
shoulder if he pushes himself up from the floor. States that he still doesn't have the strength that he needs. 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); pain is 4-5/10 when it \"pops\" for \"a split second\")       Objective:     Upper Extremity Function  UE AROM: active right shoulder IR  = can get right thumb 7\" above the waistband behind back, ER = 90  UE PROM: passive IR in standing = 80  UE Strengthing: MMT of right shoulder = 4+/5 for all motions; bidoex at 50 speed x 3 minutes forward and  3 minutes backward and handles were positioned at nose level to increase difficulty of activity; plank with bilateral elbow flexion x 10 seconds; green theraband - 25 reps of following with right UE: overhead, mcihael, horizontal abduction/adduction, and resisted diagonals                                                 Activity Tolerance: Additional Comments: Tolerated session well    Assessment:  Assessment: Patient is making very nice progress toward goals. Continues to have some weakness in overhead activities and HEP additions are addressing this issue. Patient's ROM is WFL. Further therapy is required to address strength of right UE to allow patient to complete all work tasks without difficulty. Patient Education:  Patient Education: goal status; planks with bilateral elbow flexion            Plan:  Times per week: 1 x week  Plan weeks: 2 weeks  Plan Comment: POC updated and discussed with patient  Specific instructions for Next Treatment: strengthening    Patient goals : Be able to do my job. Be able to throw football with kids. Short term goals  Time Frame for Short term goals: 2 weeks  Short term goal 1: Be independent with HEP as instructed to increase his ability to care for children. GOAL MET - CONTINUE WITH HEP UPDATES  Short term goal 2:   Increase active right shoulder IR to allow patient to get right thumb 5\" above waistband

## 2018-03-26 ENCOUNTER — HOSPITAL ENCOUNTER (OUTPATIENT)
Dept: OCCUPATIONAL THERAPY | Age: 33
Setting detail: THERAPIES SERIES
Discharge: HOME | End: 2018-03-26
Payer: MEDICARE

## 2018-03-26 PROCEDURE — 97110 THERAPEUTIC EXERCISES: CPT

## 2018-03-26 ASSESSMENT — PAIN SCALES - GENERAL: PAINLEVEL_OUTOF10: 1

## 2018-03-26 ASSESSMENT — PAIN DESCRIPTION - ORIENTATION: ORIENTATION: RIGHT

## 2018-03-26 ASSESSMENT — PAIN DESCRIPTION - LOCATION: LOCATION: SHOULDER

## 2018-04-09 ENCOUNTER — HOSPITAL ENCOUNTER (OUTPATIENT)
Dept: OCCUPATIONAL THERAPY | Age: 33
Setting detail: THERAPIES SERIES
Discharge: HOME | End: 2018-04-09
Payer: MEDICARE

## 2019-10-28 ENCOUNTER — HOSPITAL ENCOUNTER (OUTPATIENT)
Dept: GENERAL RADIOLOGY | Age: 34
Discharge: HOME | End: 2019-10-28
Payer: MEDICARE

## 2019-10-28 ENCOUNTER — HOSPITAL ENCOUNTER (OUTPATIENT)
Age: 34
Discharge: HOME | End: 2019-10-28
Payer: MEDICARE

## 2019-10-28 DIAGNOSIS — R76.8 ELEVATED ANTINUCLEAR ANTIBODY (ANA) LEVEL: ICD-10-CM

## 2019-10-28 PROCEDURE — 72202 X-RAY EXAM SI JOINTS 3/> VWS: CPT

## 2019-11-26 ENCOUNTER — HOSPITAL ENCOUNTER (OUTPATIENT)
Dept: MRI IMAGING | Age: 34
Discharge: HOME | End: 2019-11-26
Payer: MEDICARE

## 2019-11-26 DIAGNOSIS — M54.89 INFLAMMATORY BACK PAIN: ICD-10-CM

## 2019-11-26 DIAGNOSIS — G89.29 CHRONIC MIDLINE LOW BACK PAIN, UNSPECIFIED WHETHER SCIATICA PRESENT: ICD-10-CM

## 2019-11-26 DIAGNOSIS — M54.50 CHRONIC MIDLINE LOW BACK PAIN, UNSPECIFIED WHETHER SCIATICA PRESENT: ICD-10-CM

## 2019-11-26 PROCEDURE — 72195 MRI PELVIS W/O DYE: CPT

## 2020-07-17 NOTE — PROGRESS NOTES
Progressing toward goals.       Patient Education:               Plan:  Plan Comment: Continue per established POC  Specific instructions for Next Treatment: PROM, stretching                      Shalom Counter, OTR/L #[id number]
caffeine

## 2022-12-05 ENCOUNTER — HOSPITAL ENCOUNTER (OUTPATIENT)
Dept: OCCUPATIONAL THERAPY | Age: 37
Setting detail: THERAPIES SERIES
Discharge: HOME | End: 2022-12-05
Payer: MEDICARE

## 2022-12-05 PROCEDURE — 97165 OT EVAL LOW COMPLEX 30 MIN: CPT

## 2022-12-05 PROCEDURE — 97110 THERAPEUTIC EXERCISES: CPT

## 2022-12-05 NOTE — PROGRESS NOTES
** PLEASE SIGN, DATE AND TIME CERTIFICATION BELOW AND RETURN TO [facility] OUTPATIENT REHABILITATION (FAX #: [phone]). ATTEST/CO-SIGN IF ACCESSING VIA [employer]. THANK YOU.**    I certify that I have examined the patient below and determined that Physical Medicine and Rehabilitation service is necessary and that I approve the established plan of care for up to 90 days or as specifically noted. Attestation, signature or co-signature of physician indicates approval of certification requirements.    ________________________ ____________ __________  Physician Signature   Date   Time   [address] THERAPY  [x] EVALUATION  [] DAILY NOTE (LAND) [] DAILY NOTE (AQUATIC ) [] PROGRESS NOTE [] DISCHARGE NOTE    [] [address] [facility]   [] [facility] 90    [x] [address] [facility]   [] [facility]    Date: 2022  Patient Name:  Chuy Dominguez  : 1985  MRN: [id number]  CSN: [id number]    Referring Practitioner Maren Epsinosa*   Diagnosis Other shoulder lesions, right shoulder [M75.81]    Treatment Diagnosis Right shoulder pain   Date of Evaluation 22      Functional Outcome Measure Used UEFS   Functional Outcome Score 57/80 (22)       Insurance: Primary: Payor: Obadiah Barthel /  /  / ,   Secondary:    Authorization Information: PRECERTIFICATION REQUIRED:  n/a  INSURANCE THERAPY BENEFIT:  Allowed 30 visits Physical Therapy /Occupational Therapy/Speech Therapy per calendar year. No visit limit for PT/OT/ST for patients age 8 and under. FCE-Covered, no precert required. Benefit will not cover maintenance or preventative treatment. AQUATIC THERAPY COVERED:   Yes  MODALITIES COVERED:  Yes.  Iontophoresis and Hot/Cold Packs are not covered   Visit # 1, 1/10 for progress note   Visits Allowed: 30   Recertification Date: 3/98/35   Physician Follow-Up: No scheduled follow up with referring provider - patient is to call back after a couple of weeks of therapy   Physician Orders: Evaluate and treat   Pertinent History: Patient relates that he had right shoulder surgery 5 years ago which included RCR and bicep tendon repair. Patient states that pain returned about 1.5 years after surgery and he has just been tolerating the pain since that time. States that about 3-4 weeks ago he had increased pain when blowing leaves. Went to [facility] due to right shoulder pain. States that xrays didn't show anything and therapy was ordered for right shoulder pain. Comorbidities include anxiety and fibromyalgia that could influence rehab process. SUBJECTIVE: Patient relates that he does not want to have surgery on his shoulder if at all possible. States that he is currently off work until 12/28 due to right shoulder pain. Social/Functional History:  Patient is currently not taking any medications. Ronel Carrasquillo lives with spouse   Task Prior Level of Function  (current level of function addressed below)   ADLs  Independent   Ambulation Independent   Transfers Independent   Hobbies Playing guitar, throwing football with kids   Driving Active    Work [employer]. Occupation:  of [employer] (currently off work until 12/28/22)     OBJECTIVE:  [address] right handed   Palpation    Observation Demonstrates hypermobility of right shoulder with flexion, abduction, and ER. Patient reports that he has sensation of right shoulder \"popping out\" during motion. Posture good   Edema    Special Tests        ADL's Patient relates that he has difficulty getting comfortable to fall asleep. States that he has moderate difficulty with work tasks and using right UE in repetitive tasks. Bed Mobility     Transfers    Balance        Sensation Reports having numbness and tingling in both arms. States that his arms \"go numb\" at night.    Reported having right hand tingling after AROM testing of right shoulder   Coordination [facility] RIGHT UPPER EXTREMITY  RANGE OF MOTION    AROM COMMENTS        Shoulder Flexion 957 Reports clicking in shoulder when bringing arm back down into extension   Shoulder Extension 70    Shoulder Abduction 180 Slight decreased right scapulohumeral rhythm with active abduction   Shoulder External Rotation 90    Shoulder Internal Rotation Can get right thumb 9\" above waistband behind back        RIGHT UPPER EXTREMITY  STRENGTH    Strength Rating Comments   Shoulder Flexion 4+/5    Shoulder Extension 4/5    Shoulder Abduction 4+/5    Shoulder Adduction 4+/5    Shoulder External Rotation 4+/5    Shoulder Internal Rotation 4+/5      TREATMENT   Precautions: Anxiety, fibromyalgia   Pain:  3/10 at time of therapy; 8-9/10 at highest (throwing ball, repetitive use) LOCATION: anterior right shoulder - does relate that the pain does move around in his shoulder. X in shaded column indicates Activity Completed Today   Modalities Parameters/  Location  Notes/Comments                     Manual Therapy Time/  Technique  Notes/Comments                     Exercises   Sets/  Sec Reps  Notes/Comments                                                    Activities Time    Notes/Comments   Rock tape applied to right shoulder - Y strip around deltoid, Y strip on upper trapezius, I strip along bicep with 50% tension  x Completed for pain management and shoulder stability                 Specific Interventions Next Treatment: ultrasound, taping, scapular strengthening    Activity/Treatment Tolerance:  [x]  Patient tolerated treatment well  []  Patient limited by fatigue  []  Patient limited by pain   []  Patient limited by other medical complications  []  Other:     Assessment: Patient meets criteria for low complexity evaluation based on documented evaluation findings.  Patient presents with pain in right shoulder, decreased shoulder stability, and decreased scapular stability which is interfering with his ability to complete work tasks. Skilled therapy services are required to address right UE strength and right shoulder pain to allow patient to eventually complete his normal work tasks. Areas for Improvement: impaired strength, pain, and impaired ADL and shoulder stability  Prognosis: good    GOALS:  Patient Goal: To avoid surgery. 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 Report pain going no higher than 3/10 in right shoulder to allow patient to complete activities with his kids. Report being able to fall asleep with less difficulty due to right shoulder pain. Long Term Goals:  Time Frame: 12 weeks  Report pain going no higher than 2/10 at any time to increase his ability to complete his normal work tasks. Report being able to throw ball with kids using right arm with no more than 2/10 pain. Patient Education:   [x]  HEP/Education Completed: Plan of Care, Goals, HEP - use/care of rock tape; scapular retraction    []  No new Education completed  []  Reviewed Prior HEP      [x]  Patient verbalized and/or demonstrated understanding of education provided. []  Patient unable to verbalize and/or demonstrate understanding of education provided. Will continue education. [x]  Barriers to learning: none    PLAN:  Treatment Recommendations: Strengthening, Pain Management, Home Exercise Program, Self-Care Education and Training, and Modalities    [x]  Plan of care initiated. Plan to see patient 2 times per week for 12 weeks to address the treatment planned outlined above.   []  Continue with current plan of care  []  Modify plan of care as follows:    []  Hold pending physician visit  []  Discharge    Time In 1345   Time Out 1430   Timed Code Minutes: 15 min   Total Treatment Time: 45 min     Pablo Faye OTR/NATE #[id number]

## 2022-12-07 ENCOUNTER — HOSPITAL ENCOUNTER (OUTPATIENT)
Dept: OCCUPATIONAL THERAPY | Age: 37
Setting detail: THERAPIES SERIES
Discharge: HOME | End: 2022-12-07
Payer: MEDICARE

## 2022-12-07 PROCEDURE — 97110 THERAPEUTIC EXERCISES: CPT

## 2022-12-07 PROCEDURE — 97035 APP MDLTY 1+ULTRASOUND EA 15: CPT

## 2022-12-07 NOTE — PROGRESS NOTES
[address]  [] EVALUATION  [x] DAILY NOTE (LAND) [] DAILY NOTE (AQUATIC ) [] PROGRESS NOTE [] DISCHARGE NOTE    [] [address] [facility]   [] [facility]    [x] [facility]   [] [facility]    Date: 2022  Patient Name:  Chuy Dominguez  :   MRN: [id number]  CSN: [id number]    Referring Practitioner Maren Espinosa*   Diagnosis Other shoulder lesions, right shoulder [M75.81]    Treatment Diagnosis Right shoulder pain   Date of Evaluation 22      Functional Outcome Measure Used UEFS   Functional Outcome Score 57/80 (22)       Insurance: Primary: Payor: Obadiah Barthel /  /  / ,   Secondary:    Authorization Information: PRECERTIFICATION REQUIRED:  n/a  INSURANCE THERAPY BENEFIT:  Allowed 30 visits Physical Therapy /Occupational Therapy/Speech Therapy per calendar year. No visit limit for PT/OT/ST for patients age 8 and under. FCE-Covered, no precert required. Benefit will not cover maintenance or preventative treatment. AQUATIC THERAPY COVERED:   Yes  MODALITIES COVERED:  Yes. Iontophoresis and Hot/Cold Packs are not covered   Visit # 2, 2/10 for progress note   Visits Allowed: 30   Recertification Date:    Physician Follow-Up: No scheduled follow up with referring provider - patient is to call back after a couple of weeks of therapy   Physician Orders: Evaluate and treat   Pertinent History: Patient relates that he had right shoulder surgery 5 years ago which included RCR and bicep tendon repair. Patient states that pain returned about 1.5 years after surgery and he has just been tolerating the pain since that time. States that about 3-4 weeks ago he had increased pain when blowing leaves. Went to [facility] due to right shoulder pain. States that xrays didn't show anything and therapy was ordered for right shoulder pain.  Comorbidities include anxiety and fibromyalgia that could influence rehab process. SUBJECTIVE: States that he feels as though the tape makes his shoulder feel more stable but he has experienced some pain in different locations of right shoulder since tape in place. OBJECTIVE:    TREATMENT   Precautions: Anxiety, fibromyalgia   Pain:  2-3/10 pain at time of therapy LOCATION: anterior right shoulder - does relate that the pain does move around in his shoulder. X in shaded column indicates Activity Completed Today   Modalities Parameters/  Location  Notes/Comments   Ultrasound to anterior right shoulder at 100% continuous, 1 mHz, 1.2 w/cm2 for 8 minutes using ultrasound gel  x Complete for pain management               Manual Therapy Time/  Technique  Notes/Comments                     Exercises   Sets/  Sec Reps  Notes/Comments   Biodex at 80 speed x 5 minutes backward only   x    Modified hughstons with right UE - horizontal abduction/adduction with palm down and with thumb up, scaption with palm down and with thumb up 1 10 each x 5 second hold                                      Activities Time    Notes/Comments   Rock tape applied to right shoulder - Y strip around deltoid, Y strip on upper trapezius, I strip along bicep and around lateral shoulder with 100% tension for GH mechanical improvement  x Completed for pain management and shoulder stability                 Specific Interventions Next Treatment: ultrasound, taping, scapular strengthening    Activity/Treatment Tolerance:  [x]  Patient tolerated treatment well  []  Patient limited by fatigue  []  Patient limited by pain   []  Patient limited by other medical complications  []  Other:     Assessment: Patient is progressing toward goals. Patient was able to tolerate initiation of strengthening exercises this date. Patient does tend to roll right shoulder forward and patient was educated regarding need for improved posture. GOALS:  Patient Goal: To avoid surgery.     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 Report pain going no higher than 3/10 in right shoulder to allow patient to complete activities with his kids. Report being able to fall asleep with less difficulty due to right shoulder pain. Long Term Goals:  Time Frame: 12 weeks  Report pain going no higher than 2/10 at any time to increase his ability to complete his normal work tasks. Report being able to throw ball with kids using right arm with no more than 2/10 pain. Patient Education:   [x]  HEP/Education Completed: purpose of ultrasound; modified hughstons - horizontal abduction/adduction with palm down and thumb up, scaption with palm down and thumb up    []  No new Education completed  []  Reviewed Prior HEP      [x]  Patient verbalized and/or demonstrated understanding of education provided. []  Patient unable to verbalize and/or demonstrate understanding of education provided. Will continue education. [x]  Barriers to learning: none    PLAN:      []  Plan of care initiated. Plan to see patient 2 times per week for 12 weeks to address the treatment planned outlined above.   [x]  Continue with current plan of care  []  Modify plan of care as follows:    []  Hold pending physician visit  []  Discharge    Time In 1245   Time Out 1330   Timed Code Minutes: 45 min   Total Treatment Time: 45 min     ANA MARIA Quezada/NATE #[id number]

## 2022-12-14 ENCOUNTER — HOSPITAL ENCOUNTER (OUTPATIENT)
Dept: OCCUPATIONAL THERAPY | Age: 37
Setting detail: THERAPIES SERIES
Discharge: HOME | End: 2022-12-14
Payer: MEDICARE

## 2022-12-14 PROCEDURE — 97110 THERAPEUTIC EXERCISES: CPT

## 2022-12-14 PROCEDURE — 97140 MANUAL THERAPY 1/> REGIONS: CPT

## 2022-12-14 PROCEDURE — 97035 APP MDLTY 1+ULTRASOUND EA 15: CPT

## 2022-12-14 NOTE — PROGRESS NOTES
[address]  [] EVALUATION  [x] DAILY NOTE (LAND) [] DAILY NOTE (AQUATIC ) [] PROGRESS NOTE [] DISCHARGE NOTE    [] [address] [facility]   [] [facility]    [facility]   [] [facility]    Date: 2022  Patient Name:  Angelica Dhaliwal  : [birth year]  MRN: [id number]  CSN: [id number]    Referring Practitioner Basil Sy*   Diagnosis Other shoulder lesions, right shoulder [M75.81]    Treatment Diagnosis Right shoulder pain   Date of Evaluation 22      Functional Outcome Measure Used UEFS   Functional Outcome Score 57/80 (22)       Insurance: Primary: Payor: Alana Chaudhryer /  /  / ,   Secondary:    Authorization Information: PRECERTIFICATION REQUIRED:  n/a  INSURANCE THERAPY BENEFIT:  Allowed 30 visits Physical Therapy /Occupational Therapy/Speech Therapy per calendar year. No visit limit for PT/OT/ST for patients age 8 and under. FCE-Covered, no precert required. Benefit will not cover maintenance or preventative treatment. AQUATIC THERAPY COVERED:   Yes  MODALITIES COVERED:  Yes. Iontophoresis and Hot/Cold Packs are not covered   Visit # 3, 3/10 for progress note   Visits Allowed: 30   Recertification Date: 42   Physician Follow-Up: No scheduled follow up with referring provider - patient is to call back after a couple of weeks of therapy   Physician Orders: Evaluate and treat   Pertinent History: Patient relates that he had right shoulder surgery 5 years ago which included RCR and bicep tendon repair. Patient states that pain returned about 1.5 years after surgery and he has just been tolerating the pain since that time. States that about 3-4 weeks ago he had increased pain when blowing leaves. Went to [facility] due to right shoulder pain. States that xrays didn't show anything and therapy was ordered for right shoulder pain.  Comorbidities include anxiety and fibromyalgia that could influence rehab process. SUBJECTIVE: States that he has noticed that since the last shoulder taping he had some pain in the right shoulder joint - \"like it was jammed\". States that he took the tape off on Monday and this feeling went away. States that his original pain has returned since the taping was taken off. States that he now has some pain in his right upper trapezius (more noticeable without the tape). States that his right shoulder feels \"more sturdy\" when it is taped but the pain continues to be present. States that the pain is \"inside my shoulder\" when the tape is in place.          OBJECTIVE:    TREATMENT   Precautions: Anxiety, fibromyalgia   Pain:  3/10 pain at time of therapy LOCATION: right upper trapezius and lateral deltoid    X in shaded column indicates Activity Completed Today   Modalities Parameters/  Location  Notes/Comments   Ultrasound to right upper trapezius at 100% continuous, 1 mHz, 1.2 w/cm2 for 8 minutes using ultrasound gel  x Complete for pain management               Manual Therapy Time/  Technique  Notes/Comments   IASTM and STM completed to right upper trapezius, rhomboid, and scalene region  x High amount of tightness present               Exercises   Sets/  Sec Reps  Notes/Comments   Biodex at 80 speed x 5 minutes backward only       Modified hughstons with right UE - horizontal abduction/adduction with palm down and with thumb up, scaption with palm down and with thumb up 1 10 each  5 second hold   Right upper trapezius stretch with right hand behind back with lateral neck flexion 1 5 x 10 second hold; completed in standing    Right upper trapezius stretch with right hand behind back while looking into left axilla 1 5 x 10 second hold; completed in standing                        Activities Time    Notes/Comments   Rock tape applied to right shoulder - Y strip around deltoid, Y strip on upper trapezius, I strip along bicep and around lateral shoulder with 100% tension for [address] mechanical improvement   Completed for pain management and shoulder stability                 Specific Interventions Next Treatment: ultrasound, taping, scapular strengthening    Activity/Treatment Tolerance:  [x]  Patient tolerated treatment well  []  Patient limited by fatigue  []  Patient limited by pain   []  Patient limited by other medical complications  []  Other:     Assessment: Patient continues to have right shoulder pain and is now complaining of more neck pain. Patient did report having less tightness in right upper trapezius and neck region at end of session. Patient could benefit from further testing and/or orthopedic consultation for right shoulder pain. GOALS:  Patient Goal: To avoid surgery. 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 Report pain going no higher than 3/10 in right shoulder to allow patient to complete activities with his kids. Report being able to fall asleep with less difficulty due to right shoulder pain. Long Term Goals:  Time Frame: 12 weeks  Report pain going no higher than 2/10 at any time to increase his ability to complete his normal work tasks. Report being able to throw ball with kids using right arm with no more than 2/10 pain. Patient Education:   [x]  HEP/Education Completed: right upper trapezius stretch with lateral neck flexion and upper trapezius stretch with looking into left axilla    []  No new Education completed  []  Reviewed Prior HEP      [x]  Patient verbalized and/or demonstrated understanding of education provided. []  Patient unable to verbalize and/or demonstrate understanding of education provided. Will continue education. [x]  Barriers to learning: none    PLAN:      []  Plan of care initiated. Plan to see patient 2 times per week for 12 weeks to address the treatment planned outlined above.   [x]  Continue with current plan of care  []  Modify plan of care as follows:    []  Hold pending physician visit  []  Discharge    Time In 1005   Time Out 1045   Timed Code Minutes: 40 min   Total Treatment Time: 40 min     Jose Armando Dalton OTR/NATE #[id number]

## 2022-12-16 ENCOUNTER — HOSPITAL ENCOUNTER (OUTPATIENT)
Dept: OCCUPATIONAL THERAPY | Age: 37
Setting detail: THERAPIES SERIES
Discharge: HOME | End: 2022-12-16
Payer: MEDICARE

## 2022-12-16 PROCEDURE — 97110 THERAPEUTIC EXERCISES: CPT

## 2022-12-16 PROCEDURE — 97140 MANUAL THERAPY 1/> REGIONS: CPT

## 2022-12-16 PROCEDURE — 97035 APP MDLTY 1+ULTRASOUND EA 15: CPT

## 2022-12-16 NOTE — PROGRESS NOTES
[address]  [] EVALUATION  [x] DAILY NOTE (LAND) [] DAILY NOTE (AQUATIC ) [] PROGRESS NOTE [] DISCHARGE NOTE    [] [facility]   [] [address]    [x] [facility]   [] Laure Sheffield    Date: 2022  Patient Name:  Vandana Pelaez  :   MRN: [id number]  CSN: [id number]    Referring Practitioner Johnnie Newman*   Diagnosis Other shoulder lesions, right shoulder [M75.81]    Treatment Diagnosis Right shoulder pain   Date of Evaluation 22      Functional Outcome Measure Used UEFS   Functional Outcome Score 57/80 (22)       Insurance: Primary: Payor: Alex Izquierdo /  /  / ,   Secondary:    Authorization Information: PRECERTIFICATION REQUIRED:  n/a  INSURANCE THERAPY BENEFIT:  Allowed 30 visits Physical Therapy /Occupational Therapy/Speech Therapy per calendar year. No visit limit for PT/OT/ST for patients age 8 and under. FCE-Covered, no precert required. Benefit will not cover maintenance or preventative treatment. AQUATIC THERAPY COVERED:   Yes  MODALITIES COVERED:  Yes. Iontophoresis and Hot/Cold Packs are not covered   Visit # 4, /10 for progress note   Visits Allowed: 30   Recertification Date: 12   Physician Follow-Up: No scheduled follow up with referring provider - patient is to call back after a couple of weeks of therapy   Physician Orders: Evaluate and treat   Pertinent History: Patient relates that he had right shoulder surgery 5 years ago which included RCR and bicep tendon repair. Patient states that pain returned about 1.5 years after surgery and he has just been tolerating the pain since that time. States that about 3-4 weeks ago he had increased pain when blowing leaves. Went to [facility] due to right shoulder pain. States that xrays didn't show anything and therapy was ordered for right shoulder pain.  Comorbidities include anxiety and fibromyalgia that could influence rehab process. SUBJECTIVE: States that he contacted [facility]NP and asked for a referral for orthopedic surgeon. States that he hasn't heard anything about the referral yet. States that he was sore after last therapy session - states that most of the pain is in his right upper trapezius and down the back of his upper right arm. States that he feels a high amount of stretching when he turns his head to the left.           OBJECTIVE:    TREATMENT   Precautions: Anxiety, fibromyalgia   Pain:  4/10 pain at time of therapy LOCATION: right upper trapezius and lateral deltoid    X in shaded column indicates Activity Completed Today   Modalities Parameters/  Location  Notes/Comments   Ultrasound to right upper trapezius at 100% continuous, 1 mHz, 1.2 w/cm2 for 8 minutes using ultrasound gel  x Complete for pain management               Manual Therapy Time/  Technique  Notes/Comments    and STM completed to right upper trapezius region  x Tightness present, but less than last therapy session; reported it feeling looser at end of session               Exercises   Sets/  Sec Reps  Notes/Comments   Biodex at 80 speed x 5 minutes backward only   x    Modified hughstons with right UE - horizontal abduction/adduction with palm down and with thumb up, scaption with palm down and with thumb up 1 10 each  5 second hold   Right upper trapezius stretch with right hand behind back with lateral neck flexion 1 5  10 second hold; completed in standing    Right upper trapezius stretch with right hand behind back while looking into left axilla 1 5  10 second hold; completed in standing   Scapular retraction 1 15 x    Backward shoulder circles 1 15 x           Activities Time    Notes/Comments   Rock tape applied to right shoulder - Y strip around deltoid, Y strip on upper trapezius,   x Completed for pain management                  Specific Interventions Next Treatment: ultrasound, taping, scapular strengthening    Activity/Treatment Tolerance:  [x]  Patient tolerated treatment well  []  Patient limited by fatigue  []  Patient limited by pain   []  Patient limited by other medical complications  []  Other:     Assessment: Patient is progressing slowly toward goals, but tightness is decreasing slightly in left upper trapezius region. Patient could benefit from dry needling to right upper trapezius and shoulder regions - OTR will send script to referring provider. GOALS:  Patient Goal: To avoid surgery. 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 Report pain going no higher than 3/10 in right shoulder to allow patient to complete activities with his kids. Report being able to fall asleep with less difficulty due to right shoulder pain. Long Term Goals:  Time Frame: 12 weeks  Report pain going no higher than 2/10 at any time to increase his ability to complete his normal work tasks. Report being able to throw ball with kids using right arm with no more than 2/10 pain. Patient Education:   [x]  HEP/Education Completed: plan for OTR to send script for dry needling    []  No new Education completed  []  Reviewed Prior HEP      [x]  Patient verbalized and/or demonstrated understanding of education provided. []  Patient unable to verbalize and/or demonstrate understanding of education provided. Will continue education. [x]  Barriers to learning: none    PLAN:      []  Plan of care initiated. Plan to see patient 2 times per week for 12 weeks to address the treatment planned outlined above.   [x]  Continue with current plan of care  []  Modify plan of care as follows:    []  Hold pending physician visit  []  Discharge    Time In 1050   Time Out 1130   Timed Code Minutes: 40 min   Total Treatment Time: 40 min     Tony Cadet OTR/L #[id number]

## 2022-12-19 ENCOUNTER — HOSPITAL ENCOUNTER (OUTPATIENT)
Dept: OCCUPATIONAL THERAPY | Age: 37
Setting detail: THERAPIES SERIES
Discharge: HOME | End: 2022-12-19
Payer: MEDICARE

## 2022-12-19 PROCEDURE — 97110 THERAPEUTIC EXERCISES: CPT

## 2022-12-19 PROCEDURE — 97140 MANUAL THERAPY 1/> REGIONS: CPT

## 2022-12-19 NOTE — PROGRESS NOTES
[address]  [] EVALUATION  [x] DAILY NOTE (LAND) [] DAILY NOTE (AQUATIC ) [] PROGRESS NOTE [] DISCHARGE NOTE    [] [facility]   [] [address]    [x] [facility]   [] Mick Duel    Date: 2022  Patient Name:  Efraim Hodgkins  :   MRN: [id number]  CSN: [id number]    Referring Practitioner Yovani Smith*   Diagnosis Other shoulder lesions, right shoulder [M75.81]    Treatment Diagnosis Right shoulder pain   Date of Evaluation 22      Functional Outcome Measure Used UEFS   Functional Outcome Score 57/80 (22)       Insurance: Primary: Payor: Nuris Wagner /  /  / ,   Secondary:    Authorization Information: PRECERTIFICATION REQUIRED:  n/a  INSURANCE THERAPY BENEFIT:  Allowed 30 visits Physical Therapy /Occupational Therapy/Speech Therapy per calendar year. No visit limit for PT/OT/ST for patients age 8 and under. FCE-Covered, no precert required. Benefit will not cover maintenance or preventative treatment. AQUATIC THERAPY COVERED:   Yes  MODALITIES COVERED:  Yes. Iontophoresis and Hot/Cold Packs are not covered   Visit # 5, 5/10 for progress note   Visits Allowed: 30   Recertification Date:    Physician Follow-Up: [facility] - 22   Physician Orders: Evaluate and treat; dry needling script of 22   Pertinent History: Patient relates that he had right shoulder surgery 5 years ago which included RCR and bicep tendon repair. Patient states that pain returned about 1.5 years after surgery and he has just been tolerating the pain since that time. States that about 3-4 weeks ago he had increased pain when blowing leaves. Went to [facility] due to right shoulder pain. States that xrays didn't show anything and therapy was ordered for right shoulder pain. Comorbidities include anxiety and fibromyalgia that could influence rehab process.      SUBJECTIVE: States that he has an appointment with [facility] on 12/21/22. OBJECTIVE:    TREATMENT   Precautions: Anxiety, fibromyalgia   Pain:  3/10 pain at time of therapy LOCATION: right upper trapezius and lateral deltoid    X in shaded column indicates Activity Completed Today   Modalities Parameters/  Location  Notes/Comments   Ultrasound to right upper trapezius at 100% continuous, 1 mHz, 1.2 w/cm2 for 8 minutes using ultrasound gel   Complete for pain management               Manual Therapy Time/  Technique  Notes/Comments    and STM completed to right upper trapezius region  x Completed after dry needling               Exercises   Sets/  Sec Reps  Notes/Comments   Biodex at 80 speed x 5 minutes backward only   x    Modified hughstons with right UE - horizontal abduction/adduction with palm down and with thumb up, scaption with palm down and with thumb up 1 10 each  5 second hold   Right upper trapezius stretch with right hand behind back with lateral neck flexion 1 5  10 second hold; completed in standing    Right upper trapezius stretch with right hand behind back while looking into left axilla 1 5  10 second hold; completed in standing   Scapular retraction 1 15     Backward shoulder circles 1 15            Activities Time    Notes/Comments   Rock tape applied to right shoulder - Y strip around deltoid, Y strip on upper trapezius,    Completed for pain management                  Dry Needling Safety Questions Response   Are you currently receiving any form of cancer treatment? No   Do you have any form of a bleeding disorder? No   Have you ever fainted or experienced a seizure? No   Do you have a pacemaker or any other electrical implant? No   Are you currently taking any anticoagulants? No   Are you currently taking antibiotics for an infection? No   Do you have a damaged heart valve, metal prosthesis, or other risk of infection? No   Are you pregnant or actively trying for a pregnancy? No   Do you have breast implants?  No Do you suffer from metal allergies? No   Are you diabetic or do you suffer from impaired wound healing? No   Do you have hepatitis B, hepatitis C, HIV, or any other infectious disease? No   Have you eaten in the last two hours? Yes: Comment: ate lunch     Dry needling manual therapy: consisted of the placement of 4  needles in the following muscles: right upper trapezius, , . A 40 mm needle was inserted, pistoned, rotated, and/or coned to produce intramuscular mobilization. These techniques were used to restore functional range of motion, reduce muscle spasm and induce healing in the corresponding musculature. (90452)   Clean Technique was utilized today while applying Dry needling treatment. The treatment sites were cleaned with 70% solution of isopropyl alcohol. The therapist washed/sanitized their hands and utilized treatment gloves prior to inserting the needles. All needles were removed and discarded in the appropriate sharps container. \"      Specific Interventions Next Treatment: ultrasound, taping, scapular strengthening    Activity/Treatment Tolerance:  [x]  Patient tolerated treatment well  []  Patient limited by fatigue  []  Patient limited by pain   []  Patient limited by other medical complications  []  Other:     Assessment: Patient tolerated dry needling treatment well. Patient did have slightly decreased tightness in right upper trapezius at end of session, but patient did report soreness. Instructed patient to hydrate after session. GOALS:  Patient Goal: To avoid surgery. 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 Report pain going no higher than 3/10 in right shoulder to allow patient to complete activities with his kids. Report being able to fall asleep with less difficulty due to right shoulder pain.     Long Term Goals:  Time Frame: 12 weeks  Report pain going no higher than 2/10 at any time to increase his ability to complete his normal work tasks. Report being able to throw ball with kids using right arm with no more than 2/10 pain. Patient Education:   [x]  HEP/Education Completed: dry needling principles    []  No new Education completed  []  Reviewed Prior HEP      [x]  Patient verbalized and/or demonstrated understanding of education provided. []  Patient unable to verbalize and/or demonstrate understanding of education provided. Will continue education. [x]  Barriers to learning: none    PLAN:      []  Plan of care initiated. Plan to see patient 2 times per week for 12 weeks to address the treatment planned outlined above.   [x]  Continue with current plan of care  []  Modify plan of care as follows:    []  Hold pending physician visit  []  Discharge    Time In 1435   Time Out 1515   Timed Code Minutes: 40 min   Total Treatment Time: 40 min     Sharda Jose OTR/L #[id number]

## 2022-12-21 ENCOUNTER — HOSPITAL ENCOUNTER (OUTPATIENT)
Dept: OCCUPATIONAL THERAPY | Age: 37
Setting detail: THERAPIES SERIES
Discharge: HOME | End: 2022-12-21
Payer: MEDICARE

## 2022-12-21 PROCEDURE — 97140 MANUAL THERAPY 1/> REGIONS: CPT

## 2022-12-21 PROCEDURE — 97110 THERAPEUTIC EXERCISES: CPT

## 2022-12-21 NOTE — PROGRESS NOTES
[address]  [] EVALUATION  [x] DAILY NOTE (LAND) [] DAILY NOTE (AQUATIC ) [] PROGRESS NOTE [] DISCHARGE NOTE    [] [facility]   [] [address]    [x] [facility]   [] Janee Guzman    Date: 2022  Patient Name:  Meghna Schmidt  : [birth year]  MRN: [id number]  CSN: [id number]    Referring Practitioner Bety Sifuentes*   Diagnosis Other shoulder lesions, right shoulder [M75.81]    Treatment Diagnosis Right shoulder pain   Date of Evaluation 22      Functional Outcome Measure Used UEFS   Functional Outcome Score 57/80 (22)       Insurance: Primary: Payor: Scot Kirk /  /  / ,   Secondary:    Authorization Information: PRECERTIFICATION REQUIRED:  n/a  INSURANCE THERAPY BENEFIT:  Allowed 30 visits Physical Therapy /Occupational Therapy/Speech Therapy per calendar year. No visit limit for PT/OT/ST for patients age 8 and under. FCE-Covered, no precert required. Benefit will not cover maintenance or preventative treatment. AQUATIC THERAPY COVERED:   Yes  MODALITIES COVERED:  Yes. Iontophoresis and Hot/Cold Packs are not covered   Visit # 6, 6/10 for progress note   Visits Allowed: 30   Recertification Date:    Physician Follow-Up: [facility] - 22   Physician Orders: Evaluate and treat; dry needling script of 22   Pertinent History: Patient relates that he had right shoulder surgery 5 years ago which included RCR and bicep tendon repair. Patient states that pain returned about 1.5 years after surgery and he has just been tolerating the pain since that time. States that about 3-4 weeks ago he had increased pain when blowing leaves. Went to [facility] due to right shoulder pain. States that xrays didn't show anything and therapy was ordered for right shoulder pain. Comorbidities include anxiety and fibromyalgia that could influence rehab process.      SUBJECTIVE: Patient states that he didn't have as much tightness on right side of neck after dry needling and this lasted about a day. Tightness is present now. Patient states that he has a lot of \"pressure\" on the outside of the \"ball of my shoulder. \"         OBJECTIVE:    TREATMENT   Precautions: Anxiety, fibromyalgia   Pain:  4/10 pain at time of therapy LOCATION: right upper trapezius and lateral deltoid    X in shaded column indicates Activity Completed Today   Modalities Parameters/  Location  Notes/Comments   Ultrasound to right upper trapezius at 100% continuous, 1 mHz, 1.2 w/cm2 for 8 minutes using ultrasound gel   Complete for pain management               Manual Therapy Time/  Technique  Notes/Comments   IASTM and STM completed to right upper trapezius region and rhomboid region  x High amount of tightness present in these regions   STM to upper trapezius and neck region while supine  x          Exercises   Sets/  Sec Reps  Notes/Comments   Biodex at 80 speed x 5 minutes backward only   x    Modified hughstons with right UE - horizontal abduction/adduction with palm down and with thumb up, scaption with palm down and with thumb up 1 10 each  5 second hold   Right upper trapezius stretch with right hand behind back with lateral neck flexion 1 5  10 second hold; completed in standing    Right upper trapezius stretch with right hand behind back while looking into left axilla 1 5  10 second hold; completed in standing   Scapular retraction 1 15     Backward shoulder circles 1 15            Activities Time    Notes/Comments   Rock tape applied to right shoulder - Y strip around deltoid, Y strip on upper trapezius,    Completed for pain management                        Specific Interventions Next Treatment: ultrasound, taping, scapular strengthening    Activity/Treatment Tolerance:  [x]  Patient tolerated treatment well  []  Patient limited by fatigue  []  Patient limited by pain   []  Patient limited by other medical complications  []  Other:     Assessment: Patient is progressing toward goals slowly. Patient did relate that he did have slightly less pain in his neck and shoulder at end of session. GOALS:  Patient Goal: To avoid surgery. 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 Report pain going no higher than 3/10 in right shoulder to allow patient to complete activities with his kids. Report being able to fall asleep with less difficulty due to right shoulder pain. Long Term Goals:  Time Frame: 12 weeks  Report pain going no higher than 2/10 at any time to increase his ability to complete his normal work tasks. Report being able to throw ball with kids using right arm with no more than 2/10 pain. Patient Education:   []  HEP/Education Completed:     [x]  No new Education completed  []  Reviewed Prior HEP      [x]  Patient verbalized and/or demonstrated understanding of education provided. []  Patient unable to verbalize and/or demonstrate understanding of education provided. Will continue education. [x]  Barriers to learning: none    PLAN:      []  Plan of care initiated. Plan to see patient 2 times per week for 12 weeks to address the treatment planned outlined above.   [x]  Continue with current plan of care  []  Modify plan of care as follows:    []  Hold pending physician visit  []  Discharge    Time In 1135   Time Out 1215   Timed Code Minutes: 40 min   Total Treatment Time: 40 min     Migdalia Jones OTR/L #[id number]

## 2022-12-21 NOTE — PROGRESS NOTES
[facility]  Therapy Contact Note      Date: 2022  Patient Name: Davida Chung        MRN: [id number]    : 1985  (44 y.o.)  Gender: male   Kenyatta Parada*  Other shoulder lesions, right shoulder [M75.81]     Patient left message for therapist that Dr. Amador Aschoff wants him to hold shoulder therapy until after MRI. Patient will be placed on hold until MRI results. Therapist will continue to be in contact with patient.     Gregory Rea, OTR/L #[id number]

## 2022-12-28 ENCOUNTER — APPOINTMENT (OUTPATIENT)
Dept: OCCUPATIONAL THERAPY | Age: 37
End: 2022-12-28
Payer: MEDICARE

## 2022-12-30 ENCOUNTER — HOSPITAL ENCOUNTER (OUTPATIENT)
Dept: PHYSICAL THERAPY | Age: 37
Setting detail: THERAPIES SERIES
Discharge: HOME | End: 2022-12-30
Payer: MEDICARE

## 2022-12-30 ENCOUNTER — APPOINTMENT (OUTPATIENT)
Dept: OCCUPATIONAL THERAPY | Age: 37
End: 2022-12-30
Payer: MEDICARE

## 2022-12-30 PROCEDURE — 97162 PT EVAL MOD COMPLEX 30 MIN: CPT

## 2022-12-30 PROCEDURE — 97110 THERAPEUTIC EXERCISES: CPT

## 2022-12-30 NOTE — FLOWSHEET NOTE
** PLEASE SIGN, DATE AND TIME CERTIFICATION BELOW AND RETURN TO [facility] OUTPATIENT REHABILITATION (FAX #: [phone]). ATTEST/CO-SIGN IF ACCESSING VIA [employer]. THANK YOU.**    I certify that I have examined the patient below and determined that Physical Medicine and Rehabilitation service is necessary and that I approve the established plan of care for up to 90 days or as specifically noted. Attestation, signature or co-signature of physician indicates approval of certification requirements.    ________________________ ____________ __________  Physician Signature   Date   Time  [address] [facility]  PHYSICAL THERAPY  [x] EVALUATION  [] DAILY NOTE (LAND) [] DAILY NOTE (AQUATIC ) [] PROGRESS NOTE [] DISCHARGE NOTE    [] [facility]   [] [facility]    [x] [facility]   [] [facility]    Date: 2022  Patient Name:  Jose Garcia  : 1985  MRN: [id number]  CSN: [id number]    Referring Practitioner Jamaal Del Angel MD   Diagnosis Unspecified disorder of synovium and tendon, right shoulder [M67.911]  Other cervical disc degeneration, unspecified cervical region [M50.30]    Treatment Diagnosis Neck pain with radiculopathy bilat   Date of Evaluation 22    Additional Pertinent History Anxiety disorder, Brain injury, Arthritis, Fibromyalgia      Functional Outcome Measure Used NDI   Functional Outcome Score 8 (22)       Insurance: Primary: Payor: Cesar Reinoso /  /  / ,   Secondary:    Authorization Information: Allowed 30 visits per calendar year. Aquatics and modalities except Into and HPCP covered.    Visit # 1, 1/10 for progress note   Visits Allowed: 30   Recertification Date: 83   Physician Follow-Up: MRI on 23 then follow up with doctor on 23   Physician Orders:    History of Present Illness:      SUBJECTIVE: Patient has been seeing OT for right shoulder issues and about 3 weeks ago or so started having neck pain.  has had testing of his neck done in the past and it showed DDD.  is on hold right now with OT and the doctor does not want anything more done until he has his MRI done. [facility] doctor wants him to have a few sessions of PT for his neck to see what might be going on in with it. Social/Functional History and Current Status:  Medications and Allergies have been reviewed and are listed on Medical History Questionnaire. Daphney Moreno lives with spouse in a multiple floor home with stairs and a handrail to enter. Task Previous Current   ADLs  Independent Independent   IADL's Independent Modified Independent   Ambulation Independent Independent   Transfers Independent Independent   Recreation Independent Modified Independent   Community Integration Independent Modified Independent   Driving Active  Active    Work [employer]. Occupation: [employer]   Off Work Due to Injury.        OBJECTIVE:    Pain: 3/10 right side neck   Palpation    Observation Forward head and rounded shoulders   Posture Fair       Range of Motion Bilat UE not tested due to doctor does not want him moving right arm around a lot until MRI  Cervical: Flex 56, Ext 70, SB R 37, SB L 37, Rot R 70, Rot L 59   Strength Bilat UE not tested due to doctor does not want him moving right arm around a lot until MRI   Coordination    Sensation Sporadic numbness/tingling only with lifting or moving something heavy and does not last  Denies headaches   Bed Mobility    Transfers    Ambulation    Stairs    Balance States slight balance issues but is not falling or running into walls   Special Tests          TREATMENT   Precautions: None - has had to put OT on hold because doctor does not want overuse of right shoulder until MRI   Pain: 3/10 Right side cerv, slight 1-2/10 on left side    \"X in shaded column indicates activity completed today    *\" next to exercise/intervention indicates progression Modalities Parameters/  Location  Notes                     Manual Therapy Time/Technique  Notes                     Exercise/  Intervention   Notes   Loaded retractions light pressure: 2x10                                                                  Deep pressure 2x10     Light pressure with 25% ext 30 sec                             With 50% ext 30 sec                             With 75% ext 30 sec   X    X  X  X  X Decreased neck/upper back pain and right arm symptoms  No change  No change  No change  Pinch in neck felt   Educated on retractions and retractions with extension every 2 hours and monitoring symptoms. Stop if increases or produces if causes any symptoms   X Ret sets of 10  Ret with ext no more than 2x for 30 sec each   Educated on cervical isometrics 6 directions 5x5 seconds  X    Educated on SB and levator stretches along with head nodding and tipping   X    Educated on rows   X                                                Specific Interventions Next Treatment: cervical retractions and retraction with extension as needed, posture education, strengthening and stretching, modalities or manual therapy as needed    Activity/Treatment Tolerance:  [x]  Patient tolerated treatment well  []  Patient limited by fatigue  []  Patient limited by pain   []  Patient limited by medical complications  []  Other:     Assessment: Patient with history of damage in neck but only started having pain in the last 3 weeks. Patient may have a disc issue due to his neck pain and right arm symptoms decreased with use of cervical retractions. Will continue to have patient work on HEP and see how much is able to relieve symptoms and PT will adjust program as needed based on his symptom response. Patient also needs educated on strengthening but is limited to what can do due to right shoulder problems and waiting on an MRI for that.     Body Structures/Functions/Activity Limitations: impaired activity tolerance, impaired endurance, impaired ROM, pain, and abnormal posture  Prognosis: good    GOALS:  Patient Goal: To have neck feel a little looser. Short Term Goals:  Time Frame: 4 weeks  See LTG      Long Term Goals:  Time Frame: 4 weeks  1) Patient to report 25-50% decrease in neck pain for ease with sleeping  2) Patient report able to consistently decrease bilateral arm symptoms to be able to drive longer  3) Patient to be fully educated on postural corrections and all stretches to help keep spine in more neutral position  4) Patient to be independent with HEP to improve stability in spine to decrease stress with daily activity       Patient Education:   [x]  HEP/Education Completed: Plan of Care, Goals, HEP above  [address] Access Code:  []  No new Education completed  []  Reviewed Prior HEP      [x]  Patient verbalized and/or demonstrated understanding of education provided. []  Patient unable to verbalize and/or demonstrate understanding of education provided. Will continue education. []  Barriers to learning: None    PLAN:  Treatment Recommendations: Strengthening, Range of Motion, Manual Therapy - Soft Tissue Mobilization, Pain Management, Home Exercise Program, Patient Education, Integrative Dry Needling, and Modalities    [x]  Plan of care initiated. Plan to see patient 1 times per week for 4 weeks to address the treatment planned outlined above.   []  Continue with current plan of care  []  Modify plan of care as follows:    []  Hold pending physician visit  []  Discharge    Time In 0845   Time Out 0945   Timed Code Minutes: 15 min   Total Treatment Time: 60 min       Electronically Signed by: [place] [id number]

## 2023-01-05 ENCOUNTER — HOSPITAL ENCOUNTER (OUTPATIENT)
Dept: PHYSICAL THERAPY | Age: 38
Setting detail: THERAPIES SERIES
Discharge: HOME | End: 2023-01-05
Payer: MEDICARE

## 2023-01-05 PROCEDURE — 97140 MANUAL THERAPY 1/> REGIONS: CPT

## 2023-01-05 PROCEDURE — 97110 THERAPEUTIC EXERCISES: CPT

## 2023-01-05 NOTE — PROGRESS NOTES
[address] [facility]  PHYSICAL THERAPY  [] EVALUATION  [x] DAILY NOTE (LAND) [] DAILY NOTE (AQUATIC ) [] PROGRESS NOTE [] DISCHARGE NOTE    [] [facility]   [] [address] [address]    [x] [facility]   [] Nikki Alfaro    Date: 2023  Patient Name:  Hector Tripathi  :   MRN: [id number]  CSN: [id number]    Referring Practitioner Shanelle Zhou MD   Diagnosis Unspecified disorder of synovium and tendon, right shoulder [M67.911]  Other cervical disc degeneration, unspecified cervical region [M50.30]    Treatment Diagnosis Neck pain with radiculopathy bilat   Date of Evaluation 22    Additional Pertinent History Anxiety disorder, Brain injury, Arthritis, Fibromyalgia      Functional Outcome Measure Used NDI   Functional Outcome Score 8 (22)       Insurance: Primary: Payor: Fredy Sands /  /  / ,   Secondary:    Authorization Information: Allowed 30 visits per calendar year. Aquatics and modalities except Into and HPCP covered. Visit # 2, 2/10 for progress note   Visits Allowed: 30   Recertification Date:    Physician Follow-Up: MRI on 23 then follow up with doctor on 23   Physician Orders:    History of Present Illness:      SUBJECTIVE: Patient reports doing ok today. States has been using the chin tucks and the extension and they seem to help calm down his symptoms if they flare up but they are not abolishing anything. States not pain on left side of neck today, just on right side. States no trouble with isometrics.      TREATMENT   Precautions: None - has had to put OT on hold because doctor does not want overuse of right shoulder until MRI   Pain: 2/10 Right side cerv, None left side    \"X in shaded column indicates activity completed today    *\" next to exercise/intervention indicates progression   Modalities Parameters/  Location  Notes                     Manual Therapy Time/Technique  Notes Manual traction 3x1 min X No change in pain but reported felt good   Occipital release 5 min X Patient stated calmed down pain a little   Myofascial work to right side of neck 5 min X Tightness noted throughout and patient reported felt good   Exercise/  Intervention   Notes   Loaded retractions light pressure: 2x10                                                                  Deep pressure 2x10     Light pressure with 25% ext 30 sec                             With 50% ext 30 sec                             With 75% ext 30 sec            Decreased neck/upper back pain and right arm symptoms  No change  No change  No change  Pinch in neck felt   Educated on retractions and retractions with extension every 2 hours and monitoring symptoms. Stop if increases or produces if causes any symptoms   X Ret sets of 10  Ret with ext no more than 2x for 30 sec each   Educated on cervical isometrics 6 directions 5x5 seconds  X    Educated on SB and levator stretches along with head nodding and tipping   X Showed how to anchor arm to get a better stretch without as much head motion if stretch starts to make his neck \"feel funny\"   Educated on rows   X    Had demonstrate retractions and ret with ext. Patient not doing either fully correctly so cued on what to change and had him repeat and he was able to do correctly then. X                                         Specific Interventions Next Treatment: cervical retractions and retraction with extension as needed, posture education, strengthening and stretching, modalities or manual therapy as needed    Activity/Treatment Tolerance:  [x]  Patient tolerated treatment well  []  Patient limited by fatigue  []  Patient limited by pain   []  Patient limited by medical complications  []  Other:     Assessment: Patient not performing retractions correctly so needed cueing today and then was able to do correctly.  Started some manual therapy today and felt good per patient reports plus decreased his pain slightly. Patient has one more appt scheduled per doctor's script and then will plan on discharge to HEP. Will make sure patient has full HEP and everything still going well. GOALS:  Patient Goal: To have neck feel a little looser. Short Term Goals:  Time Frame: 4 weeks  See LTG      Long Term Goals:  Time Frame: 4 weeks  1) Patient to report 25-50% decrease in neck pain for ease with sleeping  2) Patient report able to consistently decrease bilateral arm symptoms to be able to drive longer  3) Patient to be fully educated on postural corrections and all stretches to help keep spine in more neutral position  4) Patient to be independent with HEP to improve stability in spine to decrease stress with daily activity       Patient Education:   [x]  HEP/Education Completed: Continue with [facility]  [employer] Access Code:  []  No new Education completed  []  Reviewed Prior HEP      [x]  Patient verbalized and/or demonstrated understanding of education provided. []  Patient unable to verbalize and/or demonstrate understanding of education provided. Will continue education. []  Barriers to learning: None    PLAN:  []  Plan of care initiated. Plan to see patient 1 times per week for 4 weeks to address the treatment planned outlined above.   [x]  Continue with current plan of care  []  Modify plan of care as follows:    []  Hold pending physician visit  []  Discharge    Time In 1100   Time Out 1130   Timed Code Minutes: 30 min   Total Treatment Time: 30 min       Electronically Signed by: [place] [id number]

## 2023-01-10 ENCOUNTER — HOSPITAL ENCOUNTER (OUTPATIENT)
Dept: PHYSICAL THERAPY | Age: 38
Setting detail: THERAPIES SERIES
Discharge: HOME | End: 2023-01-10
Payer: MEDICARE

## 2023-01-10 PROCEDURE — 97110 THERAPEUTIC EXERCISES: CPT

## 2023-01-10 PROCEDURE — 97140 MANUAL THERAPY 1/> REGIONS: CPT

## 2023-01-16 ENCOUNTER — HOSPITAL ENCOUNTER (OUTPATIENT)
Dept: OCCUPATIONAL THERAPY | Age: 38
Setting detail: THERAPIES SERIES
Discharge: HOME | End: 2023-01-16
Payer: MEDICARE

## 2023-01-16 NOTE — DISCHARGE SUMMARY
[address] [place] [address]    Date: 2023  Patient Name: Pam Schwab        CSN: [id number]   : 1985  (44 y.o.)  Gender: male   Cindy Irving*,    Other shoulder lesions, right shoulder [M75.81],      Patient is discharged from Occupational Therapy services at this time. See last note for details related to results of therapy and goal achievement. Reason for discharge:  Patient had MRI of right shoulder on 23. Spoke with patient this date and states that physician told him that there are no structural damage in his right shoulder. Patient states that no mention was made regarding continuation of therapy and patient stated that he is to be discharged from therapy at the current time. States that he has an appointment with a neck physician later this week.   Bernard Montelongo, OTR/L #[id number]

## 2024-10-10 ENCOUNTER — HOSPITAL ENCOUNTER (OUTPATIENT)
Age: 39
Discharge: HOME | End: 2024-10-10
Payer: COMMERCIAL

## 2024-10-10 LAB
25(OH)D3 SERPL-MCNC: 26 NG/ML (ref 30–100)
ALBUMIN SERPL BCG-MCNC: 4.1 G/DL (ref 3.5–5.1)
ALP SERPL-CCNC: 64 U/L (ref 38–126)
ALT SERPL W/O P-5'-P-CCNC: 38 U/L (ref 11–66)
ANION GAP SERPL CALC-SCNC: 10 MEQ/L (ref 8–16)
AST SERPL-CCNC: 38 U/L (ref 5–40)
BASOPHILS ABSOLUTE: 0 THOU/MM3 (ref 0–0.1)
BASOPHILS NFR BLD AUTO: 0.9 %
BILIRUB SERPL-MCNC: 1.1 MG/DL (ref 0.3–1.2)
BUN SERPL-MCNC: 12 MG/DL (ref 7–22)
CALCIUM SERPL-MCNC: 9 MG/DL (ref 8.5–10.5)
CHLORIDE SERPL-SCNC: 103 MEQ/L (ref 98–111)
CO2 SERPL-SCNC: 25 MEQ/L (ref 23–33)
CREAT SERPL-MCNC: 0.9 MG/DL (ref 0.4–1.2)
CRP SERPL-MCNC: 0.4 MG/DL (ref 0–1)
DEPRECATED RDW RBC AUTO: 40 FL (ref 35–45)
EOSINOPHIL NFR BLD AUTO: 3.7 %
EOSINOPHILS ABSOLUTE: 0.2 THOU/MM3 (ref 0–0.4)
ERYTHROCYTE [DISTWIDTH] IN BLOOD BY AUTOMATED COUNT: 12.7 % (ref 11.5–14.5)
ERYTHROCYTE [SEDIMENTATION RATE] IN BLOOD BY WESTERGREN METHOD: 10 MM/HR (ref 0–10)
GFR SERPL CREATININE-BSD FRML MDRD: > 90 ML/MIN/1.73M2
GLUCOSE SERPL-MCNC: 100 MG/DL (ref 70–108)
HCT VFR BLD AUTO: 45.7 % (ref 42–52)
HGB BLD-MCNC: 15 GM/DL (ref 14–18)
IMM GRANULOCYTES # BLD AUTO: 0.05 THOU/MM3 (ref 0–0.07)
IMM GRANULOCYTES NFR BLD AUTO: 0.9 %
LYMPHOCYTES ABSOLUTE: 1.9 THOU/MM3 (ref 1–4.8)
LYMPHOCYTES NFR BLD AUTO: 34.8 %
MCH RBC QN AUTO: 28.6 PG (ref 26–33)
MCHC RBC AUTO-ENTMCNC: 32.8 GM/DL (ref 32.2–35.5)
MCV RBC AUTO: 87.2 FL (ref 80–94)
MONOCYTES ABSOLUTE: 0.4 THOU/MM3 (ref 0.4–1.3)
MONOCYTES NFR BLD AUTO: 7.1 %
NEUTROPHILS ABSOLUTE: 2.8 THOU/MM3 (ref 1.8–7.7)
NEUTROPHILS NFR BLD AUTO: 52.6 %
NRBC BLD AUTO-RTO: 0 /100 WBC
PLATELET # BLD AUTO: 153 THOU/MM3 (ref 130–400)
PMV BLD AUTO: 9.6 FL (ref 9.4–12.4)
POTASSIUM SERPL-SCNC: 4.6 MEQ/L (ref 3.5–5.2)
PROT SERPL-MCNC: 6.7 G/DL (ref 6.1–8)
RBC # BLD AUTO: 5.24 MILL/MM3 (ref 4.7–6.1)
RHEUMATOID FACT SERPL-ACNC: < 10 IU/ML (ref 0–13)
SODIUM SERPL-SCNC: 138 MEQ/L (ref 135–145)
WBC # BLD AUTO: 5.4 THOU/MM3 (ref 4.8–10.8)

## 2024-10-10 PROCEDURE — 86430 RHEUMATOID FACTOR TEST QUAL: CPT

## 2024-10-10 PROCEDURE — 86200 CCP ANTIBODY: CPT

## 2024-10-10 PROCEDURE — 85025 COMPLETE CBC W/AUTO DIFF WBC: CPT

## 2024-10-10 PROCEDURE — 86140 C-REACTIVE PROTEIN: CPT

## 2024-10-10 PROCEDURE — 86038 ANTINUCLEAR ANTIBODIES: CPT

## 2024-10-10 PROCEDURE — 36415 COLL VENOUS BLD VENIPUNCTURE: CPT

## 2024-10-10 PROCEDURE — 80053 COMPREHEN METABOLIC PANEL: CPT

## 2024-10-10 PROCEDURE — 82306 VITAMIN D 25 HYDROXY: CPT

## 2024-10-10 PROCEDURE — 85651 RBC SED RATE NONAUTOMATED: CPT

## 2024-10-12 LAB — CYCLIC CITRULLINATED PEPTIDE ANTIBODY IGG: 0.6 U/ML (ref 0–7)

## 2024-10-13 LAB — NUCLEAR IGG SER QL IA: NORMAL

## 2025-07-31 ENCOUNTER — APPOINTMENT (OUTPATIENT)
Dept: GENERAL RADIOLOGY | Age: 40
End: 2025-07-31
Payer: COMMERCIAL

## 2025-07-31 ENCOUNTER — HOSPITAL ENCOUNTER (EMERGENCY)
Age: 40
Discharge: HOME | End: 2025-07-31
Attending: EMERGENCY MEDICINE
Payer: COMMERCIAL

## 2025-07-31 VITALS
BODY MASS INDEX: 28.04 KG/M2 | OXYGEN SATURATION: 100 % | RESPIRATION RATE: 14 BRPM | DIASTOLIC BLOOD PRESSURE: 97 MMHG | HEART RATE: 97 BPM | HEIGHT: 68 IN | TEMPERATURE: 98.2 F | SYSTOLIC BLOOD PRESSURE: 161 MMHG | WEIGHT: 185 LBS

## 2025-07-31 DIAGNOSIS — S63.634A SPRAIN OF INTERPHALANGEAL JOINT OF RIGHT RING FINGER, INITIAL ENCOUNTER: Primary | ICD-10-CM

## 2025-07-31 PROCEDURE — 29130 APPL FINGER SPLINT STATIC: CPT

## 2025-07-31 PROCEDURE — 73140 X-RAY EXAM OF FINGER(S): CPT

## 2025-07-31 PROCEDURE — 99283 EMERGENCY DEPT VISIT LOW MDM: CPT

## 2025-07-31 RX ORDER — TRAMADOL HYDROCHLORIDE 50 MG/1
50 TABLET ORAL EVERY 6 HOURS PRN
COMMUNITY

## 2025-07-31 RX ORDER — METHOCARBAMOL 500 MG/1
500 TABLET, FILM COATED ORAL 3 TIMES DAILY
COMMUNITY

## 2025-07-31 RX ORDER — MELOXICAM 15 MG/1
TABLET ORAL
COMMUNITY
Start: 2025-02-18

## 2025-07-31 ASSESSMENT — LIFESTYLE VARIABLES: HOW OFTEN DO YOU HAVE A DRINK CONTAINING ALCOHOL: NEVER

## 2025-07-31 ASSESSMENT — PAIN SCALES - GENERAL: PAINLEVEL_OUTOF10: 2

## 2025-07-31 ASSESSMENT — PAIN - FUNCTIONAL ASSESSMENT: PAIN_FUNCTIONAL_ASSESSMENT: 0-10

## 2025-07-31 ASSESSMENT — PAIN DESCRIPTION - LOCATION: LOCATION: FINGER (COMMENT WHICH ONE)

## 2025-07-31 ASSESSMENT — PAIN DESCRIPTION - ORIENTATION: ORIENTATION: RIGHT

## 2025-07-31 NOTE — ED NOTES
Pt presents w/ c/o right 4th finger injury. Reports that at 0200 this morning, he was pulling himself up at work to reach something, when he felt and heard a pop. And now has decreased strength in his finger and has pain w/ certain movements.

## 2025-07-31 NOTE — ED PROVIDER NOTES
[facility] Emergency Department  [address]  [facility] [address]  Phone: [phone]  EMERGENCY DEPARTMENT ENCOUNTER      Pt Name: Ke Cantrell  MRN: [id number]  Birthdate 1985  Date of evaluation: 7/31/2025  Provider: Aden Gaston MD    CHIEF COMPLAINT       Chief Complaint   Patient presents with    Hand Injury     Right 4th finer         HISTORY OF PRESENT ILLNESS      Ke Cantrell is a 40 y.o. male who presents to the emergency department with above-noted complaint.  Patient was pulling himself up on a object at work and felt pain and pop in his right ring finger.  Continue to have pain discomfort he came here.  Has some swelling and discomfort with flexion and extension        REVIEW OF SYSTEMS     Ring finger injury.  No other trauma  Review of Systems  All systems negative except as marked.     PAST MEDICAL HISTORY     Past Medical History:   Diagnosis Date    Anxiety     Back pain     Fibromyalgia          SURGICAL HISTORY       Past Surgical History:   Procedure Laterality Date    ADENOIDECTOMY      BICEPS TENDON REPAIR Right     ROTATOR CUFF REPAIR Right     TONSILLECTOMY           CURRENT MEDICATIONS       Previous Medications    FLUOXETINE (PROZAC) 20 MG CAPSULE    Take 20 mg by mouth daily.      GABAPENTIN (NEURONTIN) 300 MG CAPSULE    Take 300 mg by mouth 3 times daily    MELOXICAM (MOBIC) 15 MG TABLET    1 tab(s) orally once a day for 30 day(s)    METHOCARBAMOL (ROBAXIN) 500 MG TABLET    Take 1 tablet by mouth 3 times daily    TRAMADOL (ULTRAM) 50 MG TABLET    Take 1 tablet by mouth every 6 hours as needed for Pain. Max Daily Amount: 200 mg       ALLERGIES       Patient has no known allergies.    FAMILY HISTORY       Family History   Problem Relation Age of Onset    Coronary Art Dis Other     Heart Disease Paternal Grandmother     Diabetes Paternal Grandmother     Other Paternal Grandmother         fibromyalgia    Heart Disease Paternal Grandfather

## 2025-07-31 NOTE — DISCHARGE INSTRUCTIONS
Use Tylenol or Motrin for pain.  Wear splint for comfort.  No use of the right hand until seen by occupational medicine.  Call them tomorrow morning for repeat evaluation

## 2025-08-01 NOTE — ED NOTES
Drug Screen done. Pt stable A&O x 3 given discharge and follow up info. Pt voiced no concerns and discharged from ER to self to home. Pt ambulated out of ER with no complications .

## 2025-08-11 SDOH — HEALTH STABILITY: PHYSICAL HEALTH: ON AVERAGE, HOW MANY MINUTES DO YOU ENGAGE IN EXERCISE AT THIS LEVEL?: 150+ MIN

## 2025-08-11 SDOH — HEALTH STABILITY: PHYSICAL HEALTH: ON AVERAGE, HOW MANY DAYS PER WEEK DO YOU ENGAGE IN MODERATE TO STRENUOUS EXERCISE (LIKE A BRISK WALK)?: 5 DAYS

## 2025-08-12 ENCOUNTER — OFFICE VISIT (OUTPATIENT)
Dept: FAMILY MEDICINE CLINIC | Age: 40
End: 2025-08-12
Payer: COMMERCIAL

## 2025-08-12 VITALS
TEMPERATURE: 98.2 F | BODY MASS INDEX: 30.29 KG/M2 | SYSTOLIC BLOOD PRESSURE: 124 MMHG | HEART RATE: 86 BPM | WEIGHT: 193 LBS | DIASTOLIC BLOOD PRESSURE: 80 MMHG | OXYGEN SATURATION: 99 % | HEIGHT: 67 IN

## 2025-08-12 DIAGNOSIS — F41.9 ANXIETY: ICD-10-CM

## 2025-08-12 DIAGNOSIS — Z00.00 ANNUAL PHYSICAL EXAM: Primary | ICD-10-CM

## 2025-08-12 PROCEDURE — 99386 PREV VISIT NEW AGE 40-64: CPT | Performed by: NURSE PRACTITIONER

## 2025-08-12 RX ORDER — CETIRIZINE HYDROCHLORIDE 10 MG/1
TABLET ORAL
COMMUNITY

## 2025-08-12 RX ORDER — HYDROXYZINE HYDROCHLORIDE 25 MG/1
25 TABLET, FILM COATED ORAL EVERY 8 HOURS PRN
Qty: 30 TABLET | Refills: 0 | Status: SHIPPED | OUTPATIENT
Start: 2025-08-12 | End: 2025-08-22

## 2025-08-12 SDOH — ECONOMIC STABILITY: FOOD INSECURITY: WITHIN THE PAST 12 MONTHS, THE FOOD YOU BOUGHT JUST DIDN'T LAST AND YOU DIDN'T HAVE MONEY TO GET MORE.: NEVER TRUE

## 2025-08-12 SDOH — ECONOMIC STABILITY: FOOD INSECURITY: WITHIN THE PAST 12 MONTHS, YOU WORRIED THAT YOUR FOOD WOULD RUN OUT BEFORE YOU GOT MONEY TO BUY MORE.: NEVER TRUE

## 2025-08-12 ASSESSMENT — PATIENT HEALTH QUESTIONNAIRE - PHQ9
SUM OF ALL RESPONSES TO PHQ QUESTIONS 1-9: 0
2. FEELING DOWN, DEPRESSED OR HOPELESS: NOT AT ALL
1. LITTLE INTEREST OR PLEASURE IN DOING THINGS: NOT AT ALL
SUM OF ALL RESPONSES TO PHQ QUESTIONS 1-9: 0

## 2025-08-12 ASSESSMENT — ENCOUNTER SYMPTOMS
NAUSEA: 0
SINUS PRESSURE: 0
SHORTNESS OF BREATH: 0
COUGH: 0
BACK PAIN: 0
WHEEZING: 0
COLOR CHANGE: 0
CHEST TIGHTNESS: 0
CONSTIPATION: 0
ABDOMINAL DISTENTION: 0
ABDOMINAL PAIN: 0
SORE THROAT: 0
VOMITING: 0
DIARRHEA: 0
STRIDOR: 0